# Patient Record
Sex: FEMALE | Race: WHITE | Employment: UNEMPLOYED | URBAN - METROPOLITAN AREA
[De-identification: names, ages, dates, MRNs, and addresses within clinical notes are randomized per-mention and may not be internally consistent; named-entity substitution may affect disease eponyms.]

---

## 2019-03-11 ENCOUNTER — OFFICE VISIT (OUTPATIENT)
Dept: PRIMARY CARE CLINIC | Age: 54
End: 2019-03-11

## 2019-03-11 VITALS
TEMPERATURE: 98.1 F | RESPIRATION RATE: 18 BRPM | DIASTOLIC BLOOD PRESSURE: 83 MMHG | SYSTOLIC BLOOD PRESSURE: 119 MMHG | BODY MASS INDEX: 24.45 KG/M2 | HEIGHT: 64 IN | OXYGEN SATURATION: 95 % | HEART RATE: 76 BPM | WEIGHT: 143.2 LBS

## 2019-03-11 DIAGNOSIS — Z12.39 BREAST CANCER SCREENING: ICD-10-CM

## 2019-03-11 DIAGNOSIS — Z83.42 FAMILY HISTORY OF HYPERCHOLESTEROLEMIA: ICD-10-CM

## 2019-03-11 DIAGNOSIS — R63.5 WEIGHT GAIN: ICD-10-CM

## 2019-03-11 DIAGNOSIS — R23.0: Primary | ICD-10-CM

## 2019-03-11 RX ORDER — TRIAMCINOLONE ACETONIDE 1 MG/G
CREAM TOPICAL 2 TIMES DAILY
COMMUNITY
End: 2019-06-11 | Stop reason: ALTCHOICE

## 2019-03-11 RX ORDER — IBUPROFEN 200 MG
TABLET ORAL
COMMUNITY

## 2019-03-11 RX ORDER — KETOCONAZOLE 20 MG/G
CREAM TOPICAL DAILY
COMMUNITY
End: 2019-06-11 | Stop reason: ALTCHOICE

## 2019-03-11 NOTE — PROGRESS NOTES
Chief Complaint   Patient presents with   [facility] Establish Care    Other     toes turn black and blue after wearing shoes and socks,for 1 month     Patient was seen at [facility] on 03/06/19 and was given Ketoconazole 2 % and Triamcinolone 0.1 %  Patient hasn't been to doctor in 15 years. Visit Vitals  /83 (BP 1 Location: Left arm, BP Patient Position: Sitting)   Pulse 76   Temp 98.1 °F (36.7 °C) (Oral)   Resp 18   Ht 5' 4\" (1.626 m)   Wt 143 lb 3.2 oz (65 kg)   SpO2 95%   BMI 24.58 kg/m²     1. Have you been to the ER, urgent care clinic since your last visit? Hospitalized since your last visit?03/06/19 [facility]    2. Have you seen or consulted any other health care providers outside of the [address] [place] [address] since your last visit? Include any pap smears or colon screening.  No

## 2019-03-11 NOTE — PROGRESS NOTES
Ulyses Severe is a 46 y.o.  female and presents with     Chief Complaint   Patient presents with   [facility] Establish Care    Other     toes turn black and blue after wearing shoes and socks,for 1 month     Pt is here to establish care. Pt has been noticing discoloration of her toes about a year ago but worse in the past month or so. Pt went to urgent care and was told she had dermatitis. Pt was given a cream but that did not help  Pt thinks it is from tight  Shoes. Pt does not drink alcohol. Pt used to smoke for a long time since age 7, She stopped 3 years back. Pt has gained wt. Pt denies SOB, cough  Pt thinks it is tight shoes. Pt has spider nevi on her anterior chest for a long time. Pt works on farm. Pt had tattoo on her rt leg long time back. Pt had rash behind her knee   No chills or fevers. H/o tick bites in the past.    Past Medical History:   Diagnosis Date    Acid reflux      Past Surgical History:   Procedure Laterality Date    BREAST SURGERY PROCEDURE UNLISTED      lumpectomy right breast    HX  SECTION   and     HX HYSTERECTOMY      Age 21     HX ORTHOPAEDIC  2003    back sugery 2 times     Current Outpatient Medications   Medication Sig    ibuprofen (MOTRIN) 200 mg tablet Take  by mouth.  ketoconazole (NIZORAL) 2 % topical cream Apply  to affected area daily.  triamcinolone acetonide (KENALOG) 0.1 % topical cream Apply  to affected area two (2) times a day. use thin layer     No current facility-administered medications for this visit. Health Maintenance   Topic Date Due    Hepatitis C Screening  1965    DTaP/Tdap/Td series (1 - Tdap) 10/22/1985    PAP AKA CERVICAL CYTOLOGY  10/22/1985    Shingrix Vaccine Age 50> (1 of 2) 10/22/2014    BREAST CANCER SCRN MAMMOGRAM  10/22/2014    FOBT Q 1 YEAR AGE 50-75  10/22/2014    Influenza Age 5 to Adult  2018       There is no immunization history on file for this patient. No LMP recorded.  Patient has had a hysterectomy. Allergies and Intolerances: Allergies   Allergen Reactions    Codeine Rash and Itching    Morphine Rash and Itching    Percocet [Oxycodone-Acetaminophen] Rash and Itching       Family History:   Family History   Problem Relation Age of Onset    Hypertension Mother     Elevated Lipids Mother        Social History:   She  reports that she has been smoking. she has never used smokeless tobacco.  She  reports that she does not drink alcohol. Review of Systems:   General: negative for - chills, fatigue, fever, weight change  Psych: negative for - anxiety, depression, irritability or mood swings  ENT: negative for - headaches, hearing change, nasal congestion, oral lesions, sneezing or sore throat  Heme/ Lymph: negative for - bleeding problems, bruising, pallor or swollen lymph nodes  Endo: negative for - hot flashes, polydipsia/polyuria or temperature intolerance  Resp: negative for - cough, shortness of breath or wheezing  CV: negative for - chest pain, edema or palpitations  GI: negative for - abdominal pain, change in bowel habits, constipation, diarrhea or nausea/vomiting  : negative for - dysuria, hematuria, incontinence, pelvic pain or vulvar/vaginal symptoms  MSK: negative for - joint pain, joint swelling or muscle pain  Neuro: negative for - confusion, headaches, seizures or weakness  Derm: negative for - dry skin, hair changes, rash or skin lesion changes, pos for bluis discoloration of toes.           Physical:   Vitals:   Vitals:    03/11/19 1330   BP: 119/83   Pulse: 76   Resp: 18   Temp: 98.1 °F (36.7 °C)   TempSrc: Oral   SpO2: 95%   Weight: 143 lb 3.2 oz (65 kg)   Height: 5' 4\" (1.626 m)           Exam:   HEENT- atraumatic,normocephalic, awake, oriented, well nourished  Neck - supple,no enlarged lymph nodes, no JVD, no thyromegaly  Chest- CTA, no rhonchi, no crackles  Heart- rrr, no murmurs / gallop/rub  Abdomen- soft,BS+,NT, no hepatosplenomegaly  Ext - no c/c/edema   Neuro- no focal deficits. Power 5/5 all extremities  Skin - warm,dry, no obvious rashes, bluis discoloration of toes both feet, single spider nevus on chest wall. Review of Data:   LABS:   No results found for: WBC, HGB, HCT, PLT, HGBEXT, HCTEXT, PLTEXT, HGBEXT, HCTEXT, PLTEXT  No results found for: NA, K, CL, CO2, GLU, BUN, CREA  No results found for: CHOL, CHOLX, CHLST, CHOLV, HDL, LDL, LDLC, DLDLP, TGLX, TRIGL, TRIGP  No results found for: GPT        Impression / Plan:        ICD-10-CM ICD-9-CM    1. Fingernails, bluish R23.0 782.5 CBC WITH AUTOMATED DIFF      METABOLIC PANEL, COMPREHENSIVE      SED RATE (ESR)      C REACTIVE PROTEIN, QT      CLARENCE COMPREHENSIVE PANEL      CARBOXY HEMOGLOBIN   2. Family history of hypercholesterolemia Z83.42 V18.19 LIPID PANEL   3. Weight gain R63.5 783.1 TSH 3RD GENERATION   4. Breast cancer screening Z12.31 V76.10 FRANCIA MAMMO BI SCREENING INCL CAD       Explained to patient risk benefits of the medications. Advised patient to stop meds if having any side effects. Pt verbalized understanding of the instructions. I have discussed the diagnosis with the patient and the intended plan as seen in the above orders. The patient has received an after-visit summary and questions were answered concerning future plans. I have discussed medication side effects and warnings with the patient as well. I have reviewed the plan of care with the patient, accepted their input and they are in agreement with the treatment goals. Reviewed plan of care. Patient has provided input and agrees with goals. Follow-up Disposition:  Return in about 10 days (around 3/21/2019).     Sammi Smith MD

## 2019-03-12 DIAGNOSIS — D72.820 LYMPHOCYTOSIS: ICD-10-CM

## 2019-03-12 DIAGNOSIS — I75.023 PURPLE TOE SYNDROME OF BOTH FEET (HCC): Primary | ICD-10-CM

## 2019-03-12 DIAGNOSIS — D72.829 LEUKOCYTOSIS, UNSPECIFIED TYPE: ICD-10-CM

## 2019-03-12 LAB
ALBUMIN SERPL-MCNC: 4.5 G/DL (ref 3.5–5.5)
ALBUMIN/GLOB SERPL: 1.5 {RATIO} (ref 1.2–2.2)
ALP SERPL-CCNC: 128 IU/L (ref 39–117)
ALT SERPL-CCNC: 24 IU/L (ref 0–32)
AST SERPL-CCNC: 18 IU/L (ref 0–40)
BASOPHILS # BLD AUTO: 0 X10E3/UL (ref 0–0.2)
BASOPHILS NFR BLD AUTO: 0 %
BILIRUB SERPL-MCNC: <0.2 MG/DL (ref 0–1.2)
BUN SERPL-MCNC: 17 MG/DL (ref 6–24)
BUN/CREAT SERPL: 30 (ref 9–23)
CALCIUM SERPL-MCNC: 9.5 MG/DL (ref 8.7–10.2)
CENTROMERE B AB SER-ACNC: <0.2 AI (ref 0–0.9)
CHLORIDE SERPL-SCNC: 103 MMOL/L (ref 96–106)
CHOLEST SERPL-MCNC: 152 MG/DL (ref 100–199)
CHROMATIN AB SERPL-ACNC: <0.2 AI (ref 0–0.9)
CO2 SERPL-SCNC: 21 MMOL/L (ref 20–29)
CREAT SERPL-MCNC: 0.56 MG/DL (ref 0.57–1)
CRP SERPL-MCNC: 4.3 MG/L (ref 0–4.9)
DSDNA AB SER-ACNC: 1 IU/ML (ref 0–9)
ENA JO1 AB SER-ACNC: <0.2 AI (ref 0–0.9)
ENA RNP AB SER-ACNC: <0.2 AI (ref 0–0.9)
ENA SCL70 AB SER-ACNC: 0.3 AI (ref 0–0.9)
ENA SM AB SER-ACNC: <0.2 AI (ref 0–0.9)
ENA SS-A AB SER-ACNC: <0.2 AI (ref 0–0.9)
ENA SS-B AB SER-ACNC: <0.2 AI (ref 0–0.9)
EOSINOPHIL # BLD AUTO: 0 X10E3/UL (ref 0–0.4)
EOSINOPHIL NFR BLD AUTO: 0 %
ERYTHROCYTE [DISTWIDTH] IN BLOOD BY AUTOMATED COUNT: 13.9 % (ref 12.3–15.4)
ERYTHROCYTE [SEDIMENTATION RATE] IN BLOOD BY WESTERGREN METHOD: 11 MM/HR (ref 0–40)
GLOBULIN SER CALC-MCNC: 3.1 G/DL (ref 1.5–4.5)
GLUCOSE SERPL-MCNC: 84 MG/DL (ref 65–99)
HCT VFR BLD AUTO: 42.7 % (ref 34–46.6)
HDLC SERPL-MCNC: 43 MG/DL
HGB BLD-MCNC: 14.5 G/DL (ref 11.1–15.9)
IMM GRANULOCYTES # BLD AUTO: 0 X10E3/UL (ref 0–0.1)
IMM GRANULOCYTES NFR BLD AUTO: 0 %
LDLC SERPL CALC-MCNC: 82 MG/DL (ref 0–99)
LYMPHOCYTES # BLD AUTO: 4.3 X10E3/UL (ref 0.7–3.1)
LYMPHOCYTES NFR BLD AUTO: 30 %
MCH RBC QN AUTO: 30.9 PG (ref 26.6–33)
MCHC RBC AUTO-ENTMCNC: 34 G/DL (ref 31.5–35.7)
MCV RBC AUTO: 91 FL (ref 79–97)
MONOCYTES # BLD AUTO: 1 X10E3/UL (ref 0.1–0.9)
MONOCYTES NFR BLD AUTO: 7 %
NEUTROPHILS # BLD AUTO: 8.8 X10E3/UL (ref 1.4–7)
NEUTROPHILS NFR BLD AUTO: 63 %
PLATELET # BLD AUTO: 380 X10E3/UL (ref 150–379)
POTASSIUM SERPL-SCNC: 4.3 MMOL/L (ref 3.5–5.2)
PROT SERPL-MCNC: 7.6 G/DL (ref 6–8.5)
RBC # BLD AUTO: 4.69 X10E6/UL (ref 3.77–5.28)
SEE BELOW, 164869: NORMAL
SODIUM SERPL-SCNC: 143 MMOL/L (ref 134–144)
TRIGL SERPL-MCNC: 137 MG/DL (ref 0–149)
TSH SERPL DL<=0.005 MIU/L-ACNC: 0.86 UIU/ML (ref 0.45–4.5)
VLDLC SERPL CALC-MCNC: 27 MG/DL (ref 5–40)
WBC # BLD AUTO: 14.1 X10E3/UL (ref 3.4–10.8)

## 2019-03-12 NOTE — PROGRESS NOTES
Labs reveal elevated white count and elevated lymphocyte count. Rest of the labs are normal  I have ordered repeat CBC , CXR and some other blood work that pt can do on 3/13, 3/14 or early next week .

## 2019-03-13 ENCOUNTER — HOSPITAL ENCOUNTER (OUTPATIENT)
Dept: GENERAL RADIOLOGY | Age: 54
Discharge: HOME | End: 2019-03-13
Payer: COMMERCIAL

## 2019-03-13 DIAGNOSIS — D72.820 LYMPHOCYTOSIS: ICD-10-CM

## 2019-03-13 DIAGNOSIS — D72.829 LEUKOCYTOSIS, UNSPECIFIED TYPE: ICD-10-CM

## 2019-03-13 DIAGNOSIS — R23.0 BLUISH SKIN DISCOLORATION: ICD-10-CM

## 2019-03-13 DIAGNOSIS — I75.023 PURPLE TOE SYNDROME OF BOTH FEET (HCC): ICD-10-CM

## 2019-03-13 DIAGNOSIS — D72.829 LEUKOCYTOSIS, UNSPECIFIED TYPE: Primary | ICD-10-CM

## 2019-03-13 PROCEDURE — 71046 X-RAY EXAM CHEST 2 VIEWS: CPT

## 2019-03-15 ENCOUNTER — HOSPITAL ENCOUNTER (OUTPATIENT)
Dept: MAMMOGRAPHY | Age: 54
Discharge: HOME | End: 2019-03-15
Payer: COMMERCIAL

## 2019-03-15 DIAGNOSIS — Z12.39 BREAST CANCER SCREENING: ICD-10-CM

## 2019-03-15 LAB — COHGB MFR BLD: 3.6 % (ref 0–3.6)

## 2019-03-15 PROCEDURE — 77063 BREAST TOMOSYNTHESIS BI: CPT

## 2019-03-18 DIAGNOSIS — R92.8 ABNORMAL MAMMOGRAM: Primary | ICD-10-CM

## 2019-03-18 NOTE — PROGRESS NOTES
Mammogram is abnormal. Ordered left breast ultrasound and rt breat diagnostic mammogram as recommended by radiology.

## 2019-03-19 ENCOUNTER — TELEPHONE (OUTPATIENT)
Dept: PRIMARY CARE CLINIC | Age: 54
End: 2019-03-19

## 2019-03-19 NOTE — TELEPHONE ENCOUNTER
Patients insurance covers at [address] [facility] order for \"additional follow up diagnostic mammogram on each breast\"    Additional US on left breast, more imaging on right breast     Please call patient

## 2019-03-20 ENCOUNTER — TELEPHONE (OUTPATIENT)
Dept: PRIMARY CARE CLINIC | Age: 54
End: 2019-03-20

## 2019-03-20 DIAGNOSIS — R92.8 ABNORMAL MAMMOGRAM: Primary | ICD-10-CM

## 2019-03-20 NOTE — TELEPHONE ENCOUNTER
Can we get Neurology appointment with Dr Lydia Padron in our office mext Tuesday. I will see her on Wednesday instead off Monday after getting reports from Ortho and Neurology.

## 2019-03-20 NOTE — TELEPHONE ENCOUNTER
Patient has gotten an appointment for Monday with [address] [place] [address] for diagnostic mammogram.. She would like a call back regarding her recent chest x ray results. Needs written order for \"follow up diagnostic mammogram of both breasts\" and she will pick that up Monday when she is here for her appointment. She is getting the diagnostic done on Monday as well.

## 2019-03-20 NOTE — TELEPHONE ENCOUNTER
Pt notified and verbalized her understanding. She will  order for diagnostic mammogram on Monday at [facility].

## 2019-03-25 ENCOUNTER — TELEPHONE (OUTPATIENT)
Dept: PRIMARY CARE CLINIC | Age: 54
End: 2019-03-25

## 2019-03-25 ENCOUNTER — OFFICE VISIT (OUTPATIENT)
Dept: PRIMARY CARE CLINIC | Age: 54
End: 2019-03-25

## 2019-03-25 VITALS
HEART RATE: 85 BPM | HEIGHT: 64 IN | WEIGHT: 142 LBS | BODY MASS INDEX: 24.24 KG/M2 | TEMPERATURE: 98 F | OXYGEN SATURATION: 96 % | SYSTOLIC BLOOD PRESSURE: 130 MMHG | RESPIRATION RATE: 17 BRPM | DIASTOLIC BLOOD PRESSURE: 82 MMHG

## 2019-03-25 DIAGNOSIS — Z11.59 NEED FOR HEPATITIS C SCREENING TEST: ICD-10-CM

## 2019-03-25 DIAGNOSIS — Z11.59 NEED FOR HEPATITIS B SCREENING TEST: ICD-10-CM

## 2019-03-25 DIAGNOSIS — I78.1 SPIDER ANGIOMA: Primary | ICD-10-CM

## 2019-03-25 RX ORDER — BISMUTH SUBSALICYLATE 262 MG
1 TABLET,CHEWABLE ORAL DAILY
COMMUNITY

## 2019-03-25 RX ORDER — CHOLECALCIFEROL TAB 125 MCG (5000 UNIT) 125 MCG
TAB ORAL DAILY
COMMUNITY

## 2019-03-25 NOTE — TELEPHONE ENCOUNTER
Pt is at [facility] for addition views on mammogram and they need her report from mammogram sent to them    Fax: [phone]  Attn: Olesya Ayon

## 2019-03-26 LAB
APTT HEX PL PPP: 1 SEC
APTT IMM NP PPP: NORMAL SEC
APTT PPP 1:1 SALINE: NORMAL SEC
APTT PPP: 24.8 SEC
B2 GLYCOPROT1 IGA SER-ACNC: <10 SAU
B2 GLYCOPROT1 IGG SER-ACNC: <10 SGU
B2 GLYCOPROT1 IGM SER-ACNC: <10 SMU
BASOPHILS # BLD AUTO: 0 X10E3/UL (ref 0–0.2)
BASOPHILS NFR BLD AUTO: 0 %
CARDIOLIPIN IGA SER IA-ACNC: <10 APL
CARDIOLIPIN IGG SER IA-ACNC: <10 GPL
CARDIOLIPIN IGM SER IA-ACNC: <10 MPL
CONFIRM DRVVT: NORMAL SEC
CRYOGLOB SER QL 1D COLD INC: NORMAL
DRVVT SCREEN TO CONFIRM RATIO: NORMAL RATIO
EOSINOPHIL # BLD AUTO: 0 X10E3/UL (ref 0–0.4)
EOSINOPHIL NFR BLD AUTO: 0 %
ERYTHROCYTE [DISTWIDTH] IN BLOOD BY AUTOMATED COUNT: 14.1 % (ref 12.3–15.4)
HBV CORE AB SERPL QL IA: NEGATIVE
HBV SURFACE AG SERPL QL IA: NEGATIVE
HCT VFR BLD AUTO: 43.8 % (ref 34–46.6)
HCV AB S/CO SERPL IA: <0.1 S/CO RATIO (ref 0–0.9)
HGB BLD-MCNC: 13.8 G/DL (ref 11.1–15.9)
IMM GRANULOCYTES # BLD AUTO: 0 X10E3/UL (ref 0–0.1)
IMM GRANULOCYTES NFR BLD AUTO: 0 %
LAC INTERPRETATION, 502038: NORMAL
LYMPHOCYTES # BLD AUTO: 4 X10E3/UL (ref 0.7–3.1)
LYMPHOCYTES NFR BLD AUTO: 30 %
MCH RBC QN AUTO: 29.7 PG (ref 26.6–33)
MCHC RBC AUTO-ENTMCNC: 31.5 G/DL (ref 31.5–35.7)
MCV RBC AUTO: 94 FL (ref 79–97)
MONOCYTES # BLD AUTO: 1 X10E3/UL (ref 0.1–0.9)
MONOCYTES NFR BLD AUTO: 8 %
NEUTROPHILS # BLD AUTO: 8 X10E3/UL (ref 1.4–7)
NEUTROPHILS NFR BLD AUTO: 62 %
PLATELET # BLD AUTO: 400 X10E3/UL (ref 150–379)
PLATELET NEUTRALIZATION 500049: 0 SEC
PROTHROM IGG SERPL-ACNC: 9 G UNITS
PS IGG SER IA-ACNC: 4 GPS
PS IGM SER IA-ACNC: 0 MPS
RBC # BLD AUTO: 4.65 X10E6/UL (ref 3.77–5.28)
SCREEN DRVVT: 33.2 SEC
WBC # BLD AUTO: 13.1 X10E3/UL (ref 3.4–10.8)

## 2019-04-10 ENCOUNTER — TELEPHONE (OUTPATIENT)
Dept: PRIMARY CARE CLINIC | Age: 54
End: 2019-04-10

## 2019-04-10 DIAGNOSIS — R92.1 BREAST CALCIFICATION, RIGHT: Primary | ICD-10-CM

## 2019-04-10 NOTE — TELEPHONE ENCOUNTER
Received report of diignostic MMG and ultrasound which shows rt breast calcification. Ordered biopsy of rt breast lesion as recommended by radiology.

## 2019-06-03 DIAGNOSIS — R92.8 ABNORMAL MAMMOGRAM: ICD-10-CM

## 2019-06-05 ENCOUNTER — TELEPHONE (OUTPATIENT)
Dept: PRIMARY CARE CLINIC | Age: 54
End: 2019-06-05

## 2019-06-05 DIAGNOSIS — R92.8 ABNORMAL MAMMOGRAM: Primary | ICD-10-CM

## 2019-06-05 NOTE — TELEPHONE ENCOUNTER
Need order for CPT code [id number]. New order in chart. Previous order was wrong.  faxe4d as requested to Miriam Weber @  [phone]

## 2019-06-05 NOTE — TELEPHONE ENCOUNTER
Needs order corrected to stereotactic breast biopsy instead of ultrasound biopsy.  Still for right breast.

## 2019-06-11 ENCOUNTER — OFFICE VISIT (OUTPATIENT)
Dept: PRIMARY CARE CLINIC | Age: 54
End: 2019-06-11

## 2019-06-11 VITALS
WEIGHT: 139.4 LBS | TEMPERATURE: 98.8 F | SYSTOLIC BLOOD PRESSURE: 128 MMHG | HEART RATE: 99 BPM | DIASTOLIC BLOOD PRESSURE: 70 MMHG | OXYGEN SATURATION: 96 % | RESPIRATION RATE: 17 BRPM | BODY MASS INDEX: 23.8 KG/M2 | HEIGHT: 64 IN

## 2019-06-11 DIAGNOSIS — R82.998 LEUKOCYTES IN URINE: ICD-10-CM

## 2019-06-11 DIAGNOSIS — R42 VERTIGO: ICD-10-CM

## 2019-06-11 DIAGNOSIS — Z12.11 COLON CANCER SCREENING: ICD-10-CM

## 2019-06-11 DIAGNOSIS — H93.13 TINNITUS OF BOTH EARS: ICD-10-CM

## 2019-06-11 DIAGNOSIS — R35.0 URINARY FREQUENCY: Primary | ICD-10-CM

## 2019-06-11 DIAGNOSIS — D72.829 LEUKOCYTOSIS, UNSPECIFIED TYPE: ICD-10-CM

## 2019-06-11 LAB
BILIRUB UR QL STRIP: NEGATIVE
GLUCOSE UR-MCNC: NEGATIVE MG/DL
KETONES P FAST UR STRIP-MCNC: NEGATIVE MG/DL
PH UR STRIP: 5.5 [PH] (ref 4.6–8)
PROT UR QL STRIP: NEGATIVE
SP GR UR STRIP: 1.03 (ref 1–1.03)
UA UROBILINOGEN AMB POC: NORMAL (ref 0.2–1)
URINALYSIS CLARITY POC: CLEAR
URINALYSIS COLOR POC: YELLOW
URINE BLOOD POC: NEGATIVE
URINE LEUKOCYTES POC: NORMAL
URINE NITRITES POC: NEGATIVE

## 2019-06-11 NOTE — PROGRESS NOTES
Jocelin Covington is a 46 y.o.  female and presents with     Chief Complaint   Patient presents with    Other     2 month f/u    Urinary Frequency     x 1 day    Urinary Hesitancy    Abnormal White Blood Cell Count     Pt has frequent urination and burning sensation. Pt says her toes are not as bluish  as they used to be. Pt did have bascess in her tooth and she saw dentist 2 years back who take care of it. Pt has been having vertigo rt side for 2 years. Pt takes dramamine and it seems to help a little. Pt also does exercises for her ear. Pt does get tinnitus. Pt says she had seen ENT 20 years back. Pt used to take lorazepam and xanax in the past for anxiety and panic attacks. Pt had colonscopy 15 years back. Past Medical History:   Diagnosis Date    Acid reflux      Past Surgical History:   Procedure Laterality Date    BREAST SURGERY PROCEDURE UNLISTED      lumpectomy right breast    HX BREAST BIOPSY Left     benign >12 yrs ago   [place] [address]67 and 1988    HX HYSTERECTOMY      Age 21     HX ORTHOPAEDIC  2003    back sugery 2 times     Current Outpatient Medications   Medication Sig    POTASSIUM-99 PO Take 1 Tab by mouth daily.  cholecalciferol, VITAMIN D3, (VITAMIN D3) 5,000 unit tab tablet Take  by mouth daily. Takes only in the winter    ibuprofen (MOTRIN) 200 mg tablet Take  by mouth.  multivitamin (ONE A DAY) tablet Take 1 Tab by mouth daily. No current facility-administered medications for this visit. Health Maintenance   Topic Date Due    Pneumococcal 0-64 years (1 of 1 - PPSV23) 10/22/1970    DTaP/Tdap/Td series (1 - Tdap) 10/22/1985    PAP AKA CERVICAL CYTOLOGY  10/22/1985    Shingrix Vaccine Age 50> (1 of 2) 10/22/2014    FOBT Q 1 YEAR AGE 50-75  10/22/2014    Influenza Age 5 to Adult  08/01/2019    BREAST CANCER SCRN MAMMOGRAM  03/15/2021    Hepatitis C Screening  Completed       There is no immunization history on file for this patient.   No LMP recorded. Patient has had a hysterectomy. Allergies and Intolerances: Allergies   Allergen Reactions    Codeine Rash and Itching    Morphine Rash and Itching    Percocet [Oxycodone-Acetaminophen] Rash and Itching       Family History:   Family History   Problem Relation Age of Onset    Hypertension Mother     Elevated Lipids Mother        Social History:   She  reports that she has been smoking. She has never used smokeless tobacco.  She  reports that she does not drink alcohol.             Review of Systems: pos for vertigo  General: negative for - chills, fatigue, fever, weight change  Psych: negative for - anxiety, depression, irritability or mood swings  ENT: negative for - headaches, hearing change, nasal congestion, oral lesions, sneezing or sore throat  Heme/ Lymph: negative for - bleeding problems, bruising, pallor or swollen lymph nodes  Endo: negative for - hot flashes, polydipsia/polyuria or temperature intolerance  Resp: negative for - cough, shortness of breath or wheezing  CV: negative for - chest pain, edema or palpitations  GI: negative for - abdominal pain, change in bowel habits, constipation, diarrhea or nausea/vomiting  : negative for - dysuria, hematuria, incontinence, pelvic pain or vulvar/vaginal symptoms  MSK: negative for - joint pain, joint swelling or muscle pain  Neuro: negative for - confusion, headaches, seizures or weakness  Derm: negative for - dry skin, hair changes, rash or skin lesion changes          Physical:   Vitals:   Vitals:    06/11/19 1351   BP: 128/70   Pulse: 99   Resp: 17   Temp: 98.8 °F (37.1 °C)   TempSrc: Oral   SpO2: 96%   Weight: 139 lb 6.4 oz (63.2 kg)   Height: 5' 4\" (1.626 m)           Exam:   HEENT- atraumatic,normocephalic, awake, oriented, well nourished, mild nystagmus horizontal  Neck - supple,no enlarged lymph nodes, no JVD, no thyromegaly  Chest- CTA, no rhonchi, no crackles, liverdo reticularis on anterior chest wall  Heart- rrr, no murmurs / gallop/rub  Abdomen- soft,BS+,NT, no hepatosplenomegaly, mild tenderness lower abdominal   Ext - no c/c/edema   Neuro- no focal deficits. Power 5/5 all extremities  Skin - warm,dry, no obvious rashes. Review of Data:   LABS:   Lab Results   Component Value Date/Time    WBC 13.1 (H) 03/13/2019 02:56 PM    HGB 13.8 03/13/2019 02:56 PM    HCT 43.8 03/13/2019 02:56 PM    PLATELET 903 (H) 87/26/5385 02:56 PM     Lab Results   Component Value Date/Time    Sodium 143 03/11/2019 02:11 PM    Potassium 4.3 03/11/2019 02:11 PM    Chloride 103 03/11/2019 02:11 PM    CO2 21 03/11/2019 02:11 PM    Glucose 84 03/11/2019 02:11 PM    BUN 17 03/11/2019 02:11 PM    Creatinine 0.56 (L) 03/11/2019 02:11 PM     Lab Results   Component Value Date/Time    Cholesterol, total 152 03/11/2019 02:11 PM    HDL Cholesterol 43 03/11/2019 02:11 PM    LDL, calculated 82 03/11/2019 02:11 PM    Triglyceride 137 03/11/2019 02:11 PM     No results found for: GPT        Impression / Plan:        ICD-10-CM ICD-9-CM    1. Urinary frequency R35.0 788.41 AMB POC URINALYSIS DIP STICK MANUAL W/O MICRO   2. Leukocytes in urine R82.998 791.7 CULTURE, URINE      CANCELED: CULTURE, URINE   3. Leukocytosis, unspecified type D72.829 288.60 CBC WITH AUTOMATED DIFF   4. Vertigo R42 780.4 REFERRAL TO ENT-OTOLARYNGOLOGY   5. Tinnitus of both ears H93.13 388.30 REFERRAL TO ENT-OTOLARYNGOLOGY   6. Colon cancer screening Z12.11 V76.51 REFERRAL TO GASTROENTEROLOGY         Pt reprots that she already had breast biopsy outside [facility] and it was neg . However she was told that she will need repeat diagnostic MMG rt side in 6 months. Explained to patient risk benefits of the medications. Advised patient to stop meds if having any side effects. Pt verbalized understanding of the instructions. I have discussed the diagnosis with the patient and the intended plan as seen in the above orders.   The patient has received an after-visit summary and questions were answered concerning future plans. I have discussed medication side effects and warnings with the patient as well. I have reviewed the plan of care with the patient, accepted their input and they are in agreement with the treatment goals. Reviewed plan of care. Patient has provided input and agrees with goals.         Asmita Leblanc MD

## 2019-06-12 LAB
BASOPHILS # BLD AUTO: 0 X10E3/UL (ref 0–0.2)
BASOPHILS NFR BLD AUTO: 0 %
EOSINOPHIL # BLD AUTO: 0.1 X10E3/UL (ref 0–0.4)
EOSINOPHIL NFR BLD AUTO: 1 %
ERYTHROCYTE [DISTWIDTH] IN BLOOD BY AUTOMATED COUNT: 14.2 % (ref 12.3–15.4)
HCT VFR BLD AUTO: 40.1 % (ref 34–46.6)
HGB BLD-MCNC: 13.3 G/DL (ref 11.1–15.9)
IMM GRANULOCYTES # BLD AUTO: 0 X10E3/UL (ref 0–0.1)
IMM GRANULOCYTES NFR BLD AUTO: 0 %
LYMPHOCYTES # BLD AUTO: 4.5 X10E3/UL (ref 0.7–3.1)
LYMPHOCYTES NFR BLD AUTO: 30 %
MCH RBC QN AUTO: 29.6 PG (ref 26.6–33)
MCHC RBC AUTO-ENTMCNC: 33.2 G/DL (ref 31.5–35.7)
MCV RBC AUTO: 89 FL (ref 79–97)
MONOCYTES # BLD AUTO: 1.1 X10E3/UL (ref 0.1–0.9)
MONOCYTES NFR BLD AUTO: 7 %
NEUTROPHILS # BLD AUTO: 9.1 X10E3/UL (ref 1.4–7)
NEUTROPHILS NFR BLD AUTO: 62 %
PLATELET # BLD AUTO: 413 X10E3/UL (ref 150–450)
RBC # BLD AUTO: 4.49 X10E6/UL (ref 3.77–5.28)
WBC # BLD AUTO: 14.8 X10E3/UL (ref 3.4–10.8)

## 2019-06-13 ENCOUNTER — TELEPHONE (OUTPATIENT)
Dept: PRIMARY CARE CLINIC | Age: 54
End: 2019-06-13

## 2019-06-13 LAB — BACTERIA UR CULT: NORMAL

## 2019-06-13 NOTE — TELEPHONE ENCOUNTER
----- Message from Neil Marie MD sent at 6/13/2019  9:33 AM EDT -----  Demetrius Woode count is elevated. I suspect chronic dental infection.  Pt should make appt with a dentist.

## 2019-09-24 ENCOUNTER — DOCUMENTATION ONLY (OUTPATIENT)
Dept: PRIMARY CARE CLINIC | Age: 54
End: 2019-09-24

## 2023-01-09 ENCOUNTER — OFFICE VISIT (OUTPATIENT)
Dept: PRIMARY CARE CLINIC | Age: 58
End: 2023-01-09
Payer: COMMERCIAL

## 2023-01-09 VITALS
BODY MASS INDEX: 25.44 KG/M2 | SYSTOLIC BLOOD PRESSURE: 134 MMHG | HEART RATE: 80 BPM | OXYGEN SATURATION: 96 % | RESPIRATION RATE: 18 BRPM | HEIGHT: 64 IN | DIASTOLIC BLOOD PRESSURE: 82 MMHG | TEMPERATURE: 97.8 F | WEIGHT: 149 LBS

## 2023-01-09 DIAGNOSIS — E53.8 B12 DEFICIENCY: ICD-10-CM

## 2023-01-09 DIAGNOSIS — Z23 ENCOUNTER FOR IMMUNIZATION: ICD-10-CM

## 2023-01-09 DIAGNOSIS — D72.829 LEUKOCYTOSIS, UNSPECIFIED TYPE: ICD-10-CM

## 2023-01-09 DIAGNOSIS — R07.9 CHEST PAIN, UNSPECIFIED TYPE: ICD-10-CM

## 2023-01-09 DIAGNOSIS — I73.00 RAYNAUD'S PHENOMENON WITHOUT GANGRENE: ICD-10-CM

## 2023-01-09 DIAGNOSIS — R42 VERTIGO: ICD-10-CM

## 2023-01-09 DIAGNOSIS — Z12.31 ENCOUNTER FOR SCREENING MAMMOGRAM FOR MALIGNANT NEOPLASM OF BREAST: ICD-10-CM

## 2023-01-09 DIAGNOSIS — E55.9 VITAMIN D DEFICIENCY: ICD-10-CM

## 2023-01-09 DIAGNOSIS — Z00.00 ANNUAL PHYSICAL EXAM: Primary | ICD-10-CM

## 2023-01-09 DIAGNOSIS — Z00.00 ANNUAL PHYSICAL EXAM: ICD-10-CM

## 2023-01-09 DIAGNOSIS — Z12.11 COLON CANCER SCREENING: ICD-10-CM

## 2023-01-09 PROCEDURE — 99396 PREV VISIT EST AGE 40-64: CPT | Performed by: INTERNAL MEDICINE

## 2023-01-09 PROCEDURE — 93000 ELECTROCARDIOGRAM COMPLETE: CPT | Performed by: INTERNAL MEDICINE

## 2023-01-09 PROCEDURE — 90686 IIV4 VACC NO PRSV 0.5 ML IM: CPT | Performed by: INTERNAL MEDICINE

## 2023-01-09 RX ORDER — AMLODIPINE BESYLATE 2.5 MG/1
2.5 TABLET ORAL DAILY
Qty: 90 TABLET | Refills: 1 | Status: SHIPPED | OUTPATIENT
Start: 2023-01-09

## 2023-01-09 RX ORDER — ESTRADIOL 10 UG/1
TABLET VAGINAL
COMMUNITY
Start: 2022-12-26

## 2023-01-09 NOTE — PROGRESS NOTES
Allison Arriaga is a 61 y.o.  female and presents with     Chief Complaint   Patient presents with    Physical     Need referral for mammogram, colonoscopy      Pt is here to re establish care  Pt has been felling tired. Pt helps an elderly couple. NOt sleeping well. Feels anxious. USed to have depression. Was taking wellbutrin. Pt has symptoms of Raynauds. Pt has been having chest pain off and on  Sistr has heart attack. She is 51. Pt does wape. Past Medical History:   Diagnosis Date    Acid reflux      Past Surgical History:   Procedure Laterality Date    HX BREAST BIOPSY Left     benign >12 yrs ago    [address] 23 and 1988    HX HYSTERECTOMY      Age 21     HX ORTHOPAEDIC  2003    back sugery 2 times    NE UNLISTED PROCEDURE BREAST      lumpectomy right breast     Current Outpatient Medications   Medication Sig    Yuvafem 10 mcg tab vaginal tablet INSERT 2 GRAMS PER VAGINA NIGHTLY FOR 2 WEEKS THEN 1 GRAM PER VAGINA TWICE WEEKLY    amLODIPine (NORVASC) 2.5 mg tablet Take 1 Tablet by mouth daily. ibuprofen (MOTRIN) 200 mg tablet Take  by mouth. No current facility-administered medications for this visit. Health Maintenance   Topic Date Due    DTaP/Tdap/Td series (1 - Tdap) Never done    Colorectal Cancer Screening Combo  Never done    Shingles Vaccine (1 of 2) Never done    Low dose CT lung screening  Never done    Breast Cancer Screen Mammogram  03/15/2021    COVID-19 Vaccine (1) 12/31/2023 (Originally 4/22/1966)    Depression Screen  01/09/2024    Lipid Screen  03/11/2024    Hepatitis C Screening  Completed    Flu Vaccine  Completed    Pneumococcal 0-64 years  Aged [employer] History   Administered Date(s) Administered    Influenza, FLUARIX, FLULAVAL, FLUZONE (age 10 mo+) AND AFLURIA, (age 1 y+), PF, 0.5mL 01/09/2023     No LMP recorded. Patient has had a hysterectomy. Allergies and Intolerances:    Allergies   Allergen Reactions    Codeine Rash and Itching Morphine Rash and Itching    Percocet [Oxycodone-Acetaminophen] Rash and Itching       Family History:   Family History   Problem Relation Age of Onset    Hypertension Mother     Elevated Lipids Mother        Social History:   She  reports that she quit smoking about 8 years ago. Her smoking use included cigarettes. She started smoking about 48 years ago. She has a 40.00 pack-year smoking history. She has never used smokeless tobacco.  She  reports no history of alcohol use.             Review of Systems:   General: negative for - chills, fatigue, fever, weight change  Psych: negative for - anxiety, depression, irritability or mood swings  ENT: negative for - headaches, hearing change, nasal congestion, oral lesions, sneezing or sore throat  Heme/ Lymph: negative for - bleeding problems, bruising, pallor or swollen lymph nodes  Endo: negative for - hot flashes, polydipsia/polyuria or temperature intolerance  Resp: negative for - cough, shortness of breath or wheezing  CV: negative for - chest pain, edema or palpitations  GI: negative for - abdominal pain, change in bowel habits, constipation, diarrhea or nausea/vomiting  : negative for - dysuria, hematuria, incontinence, pelvic pain or vulvar/vaginal symptoms  MSK: negative for - joint pain, joint swelling or muscle pain  Neuro: negative for - confusion, headaches, seizures or weakness  Derm: negative for - dry skin, hair changes, rash or skin lesion changes          Physical:   Vitals:   Vitals:    01/09/23 1327   BP: 134/82   Pulse: 80   Resp: 18   Temp: 97.8 °F (36.6 °C)   TempSrc: Temporal   SpO2: 96%   Weight: 149 lb (67.6 kg)   Height: 5' 4\" (1.626 m)           Exam:   HEENT- atraumatic,normocephalic, awake, oriented, well nourished  Neck - supple,no enlarged lymph nodes, no JVD, no thyromegaly  Chest- CTA, no rhonchi, no crackles  Heart- rrr, no murmurs / gallop/rub  Abdomen- soft,BS+,NT, no hepatosplenomegaly  Ext - no c/c/edema   Neuro- no focal deficits. Power 5/5 all extremities  Skin - warm,dry, no obvious rashes. Review of Data:   LABS:   Lab Results   Component Value Date/Time    WBC 14.8 (H) 06/11/2019 02:40 PM    HGB 13.3 06/11/2019 02:40 PM    HCT 40.1 06/11/2019 02:40 PM    PLATELET 559 58/40/0195 02:40 PM     Lab Results   Component Value Date/Time    Sodium 143 03/11/2019 02:11 PM    Potassium 4.3 03/11/2019 02:11 PM    Chloride 103 03/11/2019 02:11 PM    CO2 21 03/11/2019 02:11 PM    Glucose 84 03/11/2019 02:11 PM    BUN 17 03/11/2019 02:11 PM    Creatinine 0.56 (L) 03/11/2019 02:11 PM     Lab Results   Component Value Date/Time    Cholesterol, total 152 03/11/2019 02:11 PM    HDL Cholesterol 43 03/11/2019 02:11 PM    LDL, calculated 82 03/11/2019 02:11 PM    Triglyceride 137 03/11/2019 02:11 PM     No components found for: GPT        Impression / Plan:        ICD-10-CM ICD-9-CM    1. Annual physical exam  Z00.00 V70.0 HEMOGLOBIN A1C WITH EAG      CBC WITH AUTOMATED DIFF      METABOLIC PANEL, COMPREHENSIVE      LIPID PANEL      TSH 3RD GENERATION      2. Encounter for immunization  Z23 V03.89 INFLUENZA, FLUARIX, FLULAVAL, FLUZONE (AGE 6 MO+), AFLURIA(AGE 3Y+) IM, PF, 0.5 ML      3. Vertigo  R42 780.4       4. Leukocytosis, unspecified type  D72.829 288.60       5. Raynaud's phenomenon without gangrene  I73.00 443.0 CLARENCE, DIRECT, W/REFLEX      amLODIPine (NORVASC) 2.5 mg tablet      6. Vitamin D deficiency  E55.9 268.9 VITAMIN D, 25 HYDROXY      7. B12 deficiency  E53.8 266.2 VITAMIN B12 & FOLATE      8. Colon cancer screening  Z12.11 V76.51 REFERRAL TO GASTROENTEROLOGY      9. Chest pain, unspecified type  R07.9 786.50 AMB POC EKG ROUTINE W/ 12 LEADS, INTER & REP      EXERCISE CARDIAC STRESS TEST      10.  Encounter for screening mammogram for malignant neoplasm of breast  Z12.31 V76.12 FRANCIA 3D RAMSEY W MAMMO BI SCREENING INCL CAD        EKG-shows normal sinus rhythm  However given history of chest pain and positive family history for coronary artery disease-we will order exercise stress test    Explained to patient risk benefits of the medications. Advised patient to stop meds if having any side effects. Pt verbalized understanding of the instructions. I have discussed the diagnosis with the patient and the intended plan as seen in the above orders. The patient has received an after-visit summary and questions were answered concerning future plans. I have discussed medication side effects and warnings with the patient as well. I have reviewed the plan of care with the patient, accepted their input and they are in agreement with the treatment goals. Reviewed plan of care. Patient has provided input and agrees with goals. Follow-up and Dispositions    Return in about 1 year (around 1/9/2024).          Emily Albarado MD

## 2023-01-09 NOTE — PROGRESS NOTES
Chief Complaint   Patient presents with    Physical     Need referral for mammogram, colonoscopy         Visit Vitals  /82 (BP 1 Location: Left upper arm, BP Patient Position: Sitting)   Pulse 80   Temp 97.8 °F (36.6 °C) (Temporal)   Resp 18   Ht 5' 4\" (1.626 m)   Wt 149 lb (67.6 kg)   SpO2 96%   BMI 25.58 kg/m²        Health Maintenance Due   Topic    Depression Screen     Pneumococcal 0-64 years (1 - PCV)    DTaP/Tdap/Td series (1 - Tdap)    Colorectal Cancer Screening Combo     Shingles Vaccine (1 of 2)    Low dose CT lung screening     Breast Cancer Screen Mammogram     Flu Vaccine (1)        1. \"Have you been to the ER, urgent care clinic since your last visit? Hospitalized since your last visit? \" No    2. \"Have you seen or consulted any other health care providers outside of the [address] [place] [address] since your last visit? \" No     3. For patients aged 39-70: Has the patient had a colonoscopy / FIT/ Cologuard? Yes - Care Gap present. Rooming MA/LPN to request most recent results  Need referral for colonoscopy     If the patient is female:    4. For patients aged 41-77: Has the patient had a mammogram within the past 2 years? Yes - Care Gap present. Rooming MA/LPN to request most recent results  Need referral for mammogram     5. For patients aged 21-65: Has the patient had a pap smear?  Yes - no Care Gap present   [place] [facility] [employer]

## 2023-01-10 LAB
25(OH)D3 SERPL-MCNC: 28.5 NG/ML (ref 30–100)
ALBUMIN SERPL-MCNC: 3.7 G/DL (ref 3.5–5)
ALBUMIN/GLOB SERPL: 1 (ref 1.1–2.2)
ALP SERPL-CCNC: 137 U/L (ref 45–117)
ALT SERPL-CCNC: 33 U/L (ref 12–78)
ANION GAP SERPL CALC-SCNC: 6 MMOL/L (ref 5–15)
AST SERPL-CCNC: 21 U/L (ref 15–37)
BASOPHILS # BLD: 0.1 K/UL (ref 0–0.1)
BASOPHILS NFR BLD: 1 % (ref 0–1)
BILIRUB SERPL-MCNC: 0.2 MG/DL (ref 0.2–1)
BUN SERPL-MCNC: 18 MG/DL (ref 6–20)
BUN/CREAT SERPL: 23 (ref 12–20)
CALCIUM SERPL-MCNC: 9.3 MG/DL (ref 8.5–10.1)
CHLORIDE SERPL-SCNC: 107 MMOL/L (ref 97–108)
CHOLEST SERPL-MCNC: 165 MG/DL
CO2 SERPL-SCNC: 28 MMOL/L (ref 21–32)
CREAT SERPL-MCNC: 0.78 MG/DL (ref 0.55–1.02)
DIFFERENTIAL METHOD BLD: ABNORMAL
EOSINOPHIL # BLD: 0.1 K/UL (ref 0–0.4)
EOSINOPHIL NFR BLD: 1 % (ref 0–7)
ERYTHROCYTE [DISTWIDTH] IN BLOOD BY AUTOMATED COUNT: 13.4 % (ref 11.5–14.5)
EST. AVERAGE GLUCOSE BLD GHB EST-MCNC: 117 MG/DL
FOLATE SERPL-MCNC: 8.3 NG/ML (ref 5–21)
GLOBULIN SER CALC-MCNC: 3.7 G/DL (ref 2–4)
GLUCOSE SERPL-MCNC: 90 MG/DL (ref 65–100)
HBA1C MFR BLD: 5.7 % (ref 4–5.6)
HCT VFR BLD AUTO: 46.2 % (ref 35–47)
HDLC SERPL-MCNC: 45 MG/DL
HDLC SERPL: 3.7 (ref 0–5)
HGB BLD-MCNC: 14.2 G/DL (ref 11.5–16)
IMM GRANULOCYTES # BLD AUTO: 0.1 K/UL (ref 0–0.04)
IMM GRANULOCYTES NFR BLD AUTO: 0 % (ref 0–0.5)
LDLC SERPL CALC-MCNC: 89.8 MG/DL (ref 0–100)
LYMPHOCYTES # BLD: 3.5 K/UL (ref 0.8–3.5)
LYMPHOCYTES NFR BLD: 27 % (ref 12–49)
MCH RBC QN AUTO: 28.6 PG (ref 26–34)
MCHC RBC AUTO-ENTMCNC: 30.7 G/DL (ref 30–36.5)
MCV RBC AUTO: 93.1 FL (ref 80–99)
MONOCYTES # BLD: 1 K/UL (ref 0–1)
MONOCYTES NFR BLD: 8 % (ref 5–13)
NEUTS SEG # BLD: 8 K/UL (ref 1.8–8)
NEUTS SEG NFR BLD: 63 % (ref 32–75)
NRBC # BLD: 0 K/UL (ref 0–0.01)
NRBC BLD-RTO: 0 PER 100 WBC
PLATELET # BLD AUTO: 398 K/UL (ref 150–400)
PMV BLD AUTO: 10.5 FL (ref 8.9–12.9)
POTASSIUM SERPL-SCNC: 4.4 MMOL/L (ref 3.5–5.1)
PROT SERPL-MCNC: 7.4 G/DL (ref 6.4–8.2)
RBC # BLD AUTO: 4.96 M/UL (ref 3.8–5.2)
SODIUM SERPL-SCNC: 141 MMOL/L (ref 136–145)
TRIGL SERPL-MCNC: 151 MG/DL (ref ?–150)
TSH SERPL DL<=0.05 MIU/L-ACNC: 0.88 UIU/ML (ref 0.36–3.74)
VIT B12 SERPL-MCNC: 490 PG/ML (ref 193–986)
VLDLC SERPL CALC-MCNC: 30.2 MG/DL
WBC # BLD AUTO: 12.8 K/UL (ref 3.6–11)

## 2023-01-11 DIAGNOSIS — D72.829 LEUKOCYTOSIS, UNSPECIFIED TYPE: Primary | ICD-10-CM

## 2023-01-11 LAB — ANA SER QL: NEGATIVE

## 2023-01-12 NOTE — PROGRESS NOTES
Test for connective tissue diseases is negative. Cholesterol , kidney and liver function are normal  Thyroid function is normal.  F93 and folic acid levels are normal  Kidney and liver function are normal  Vitamin D is slightly below normal normal.  Would recommend vitamin D 1000 units daily over-the-counter. Hemoglobin A1c is 5.7. Patient is prediabetic. Would recommend diet and exercise. Low-carb diet  White count is slightly elevated. It has been elevated for the past few years.   I do not believe it is infection  I am referring patient to hematologist.

## 2023-01-17 DIAGNOSIS — I73.00 RAYNAUD'S PHENOMENON WITHOUT GANGRENE: Primary | ICD-10-CM

## 2023-01-30 ENCOUNTER — HOSPITAL ENCOUNTER (OUTPATIENT)
Dept: NON INVASIVE DIAGNOSTICS | Age: 58
Discharge: HOME | End: 2023-01-30
Attending: INTERNAL MEDICINE
Payer: COMMERCIAL

## 2023-01-30 VITALS — WEIGHT: 149 LBS | BODY MASS INDEX: 25.58 KG/M2

## 2023-01-30 DIAGNOSIS — R07.9 CHEST PAIN, UNSPECIFIED TYPE: ICD-10-CM

## 2023-01-30 LAB
STRESS BASELINE DIAS BP: 79 MMHG
STRESS BASELINE HR: 59 BPM
STRESS BASELINE ST DEPRESSION: 0 MM
STRESS BASELINE SYS BP: 126 MMHG
STRESS ESTIMATED WORKLOAD: 10.1 METS
STRESS EXERCISE DUR MIN: 8 MIN
STRESS EXERCISE DUR SEC: 21 SEC
STRESS PEAK DIAS BP: 94 MMHG
STRESS PEAK SYS BP: 148 MMHG
STRESS PERCENT HR ACHIEVED: 100 %
STRESS POST PEAK HR: 162 BPM
STRESS RATE PRESSURE PRODUCT: NORMAL BPM*MMHG
STRESS ST DEPRESSION: 0 MM
STRESS STAGE 1 BP: NORMAL MMHG
STRESS STAGE 1 HR: 115 BPM
STRESS STAGE 2 BP: NORMAL MMHG
STRESS STAGE 2 HR: 134 BPM
STRESS STAGE 3 HR: 162 BPM
STRESS STAGE RECOVERY 1 HR: 144 BPM
STRESS STAGE RECOVERY 2 BP: NORMAL MMHG
STRESS STAGE RECOVERY 2 HR: 115 BPM
STRESS STAGE RECOVERY 3 HR: 93 BPM
STRESS TARGET HR: 162 BPM

## 2023-01-30 PROCEDURE — 93017 CV STRESS TEST TRACING ONLY: CPT

## 2023-01-30 PROCEDURE — 93016 CV STRESS TEST SUPVJ ONLY: CPT | Performed by: INTERNAL MEDICINE

## 2023-01-30 PROCEDURE — 93018 CV STRESS TEST I&R ONLY: CPT | Performed by: INTERNAL MEDICINE

## 2023-01-30 RX ORDER — MAGNESIUM 200 MG
TABLET ORAL DAILY
COMMUNITY

## 2023-01-30 RX ORDER — CHOLECALCIFEROL TAB 125 MCG (5000 UNIT) 125 MCG
1000 TAB ORAL DAILY
COMMUNITY

## 2023-03-06 ENCOUNTER — HOSPITAL ENCOUNTER (OUTPATIENT)
Dept: MAMMOGRAPHY | Age: 58
Discharge: HOME | End: 2023-03-06
Attending: INTERNAL MEDICINE
Payer: COMMERCIAL

## 2023-03-06 DIAGNOSIS — Z12.31 ENCOUNTER FOR SCREENING MAMMOGRAM FOR MALIGNANT NEOPLASM OF BREAST: ICD-10-CM

## 2023-03-06 PROCEDURE — 77063 BREAST TOMOSYNTHESIS BI: CPT

## 2023-03-12 NOTE — PROGRESS NOTES
[address] [facility] at [address]  W: [phone]  F: [phone]    Reason for Visit:   Val Sanchez is a 61 y.o. female with Raynaud's who is seen in consultation at the request of Dr. Hubert Fernandes for evaluation of leukocytosis. History of Present Illness:   Val Sanchez is a pleasant 61 y.o. female who presents today for evaluation of leukocytosis. Patient has history of leukocytosis with neutrophilia since at least 2019 in our system. States that this has been going on for years. She was previously evaluated by a hematologist in [facility] ~30 years ago and work up was unrevealing. She thinks that her baseline 14 - 16. Other blood work with normal hemoglobin and normal platelets. Other work up: CLARENCE negative. Patient denies fevers, chills, night sweats, lumps/bumps, early satiety, abdominal pain or fullness, unintentional weight loss. Has lost 6 pounds in the last 3 weeks after starting on a low-carb diet. Reports bloating and is scheduled for colonoscopy. Had night sweats with menopause, resolved. Had COVID infection ~1 year characterized by 3 weeks of fatigue and headache. Since then, has occasional smell changes - \"smells like burning. \"        Review of systems was obtained and pertinent findings reviewed above. Past medical history, social history, family history, medications, and allergies are located in the electronic medical record.     Physical Exam:   Visit Vitals  /80 (BP 1 Location: Right arm, BP Patient Position: Sitting)   Pulse 75   Temp 98.7 °F (37.1 °C)   Resp 18   Wt 143 lb (64.9 kg)   SpO2 95%   BMI 24.55 kg/m²     ECOG PS: 0  General: no distress  Eyes: anicteric sclerae  HENT: oropharynx clear  Neck: supple  Lymphatic: no cervical, supraclavicular adenopathy  Respiratory: CTAB, normal respiratory effort  CV: RRR, no peripheral edema  Ext: warm, well perfused  GI: soft, nontender, nondistended, no masses  Skin: no rashes, no ecchymoses, no petechiae    Results:     Lab Results   Component Value Date/Time    WBC 12.8 (H) 01/09/2023 02:30 PM    HGB 14.2 01/09/2023 02:30 PM    HCT 46.2 01/09/2023 02:30 PM    PLATELET 118 14/87/1568 02:30 PM    MCV 93.1 01/09/2023 02:30 PM    ABS. NEUTROPHILS 8.0 01/09/2023 02:30 PM     Lab Results   Component Value Date/Time    Sodium 141 01/09/2023 02:30 PM    Potassium 4.4 01/09/2023 02:30 PM    Chloride 107 01/09/2023 02:30 PM    CO2 28 01/09/2023 02:30 PM    Glucose 90 01/09/2023 02:30 PM    BUN 18 01/09/2023 02:30 PM    Creatinine 0.78 01/09/2023 02:30 PM    GFR est  03/11/2019 02:11 PM    GFR est non- 03/11/2019 02:11 PM    Calcium 9.3 01/09/2023 02:30 PM     Lab Results   Component Value Date/Time    Bilirubin, total 0.2 01/09/2023 02:30 PM    ALT (SGPT) 33 01/09/2023 02:30 PM    Alk. phosphatase 137 (H) 01/09/2023 02:30 PM    Protein, total 7.4 01/09/2023 02:30 PM    Albumin 3.7 01/09/2023 02:30 PM    Globulin 3.7 01/09/2023 02:30 PM     [facility] reviewed and summarized above. Test results above have been reviewed. Assessment:     #) Leukocytosis with neutrophilia:  Chronic, present since at least 2019 in our system, although patient reports present for 30 years.   Other cell lines preserved (although has had borderline thrombocytosis)    Plan:     CBC with diff, smear, ESR, CRP, LD, BCR-ABL FISH, and EPO  Return to clinic in 2 weeks to discuss results     Signed By: Anish Garcia MD

## 2023-03-13 ENCOUNTER — OFFICE VISIT (OUTPATIENT)
Dept: ONCOLOGY | Age: 58
End: 2023-03-13
Payer: COMMERCIAL

## 2023-03-13 VITALS
RESPIRATION RATE: 18 BRPM | BODY MASS INDEX: 24.55 KG/M2 | SYSTOLIC BLOOD PRESSURE: 116 MMHG | OXYGEN SATURATION: 95 % | DIASTOLIC BLOOD PRESSURE: 80 MMHG | WEIGHT: 143 LBS | HEART RATE: 75 BPM | TEMPERATURE: 98.7 F

## 2023-03-13 DIAGNOSIS — D72.9 NEUTROPHILIA: Primary | ICD-10-CM

## 2023-03-13 PROBLEM — D72.828 NEUTROPHILIA: Status: ACTIVE | Noted: 2023-03-13

## 2023-03-13 PROCEDURE — 99203 OFFICE O/P NEW LOW 30 MIN: CPT | Performed by: INTERNAL MEDICINE

## 2023-03-13 NOTE — LETTER
3/13/2023    Patient: Chelsie Galvin   YOB: 1965   Date of Visit: 3/13/2023     Purnima Heller MD  [address]    Dear Purnima Heller MD,      Thank you for referring Ms. Chelsie Galvin to [address] [place] [address] for evaluation. My notes for this consultation are attached. If you have questions, please do not hesitate to call me. I look forward to following your patient along with you.       Sincerely,    Yolie Aguiar MD

## 2023-03-13 NOTE — PROGRESS NOTES
Mana Coleman is a 61 y.o. female    Chief Complaint   Patient presents with    New Patient      leukocytosis       1. Have you been to the ER, urgent care clinic since your last visit? Hospitalized since your last visit? No    2. Have you seen or consulted any other health care providers outside of the [address] [place] [address] since your last visit? Include any pap smears or colon screening.  No

## 2023-03-16 LAB
BASOPHILS # BLD AUTO: 0.1 X10E3/UL (ref 0–0.2)
BASOPHILS NFR BLD AUTO: 1 %
CELLS ANALYZED: 200
CELLS COUNTED: 200
CHROM ANALY RESULT (ISCN): NORMAL
CLINICAL CYTOGENETICIST SPEC: NORMAL
CRP SERPL-MCNC: 3 MG/L (ref 0–10)
DIAGNOSTIC IMP SPEC-IMP: NORMAL
EOSINOPHIL # BLD AUTO: 0.1 X10E3/UL (ref 0–0.4)
EOSINOPHIL NFR BLD AUTO: 1 %
EPO SERPL-ACNC: 8.6 MIU/ML (ref 2.6–18.5)
ERYTHROCYTE [DISTWIDTH] IN BLOOD BY AUTOMATED COUNT: 13 % (ref 11.7–15.4)
ERYTHROCYTE [SEDIMENTATION RATE] IN BLOOD BY WESTERGREN METHOD: 19 MM/HR (ref 0–40)
HCT VFR BLD AUTO: 43.3 % (ref 34–46.6)
HGB BLD-MCNC: 14.6 G/DL (ref 11.1–15.9)
IMM GRANULOCYTES # BLD AUTO: 0 X10E3/UL (ref 0–0.1)
IMM GRANULOCYTES NFR BLD AUTO: 0 %
LDH SERPL L TO P-CCNC: 291 IU/L (ref 119–226)
LYMPHOCYTES # BLD AUTO: 2.7 X10E3/UL (ref 0.7–3.1)
LYMPHOCYTES NFR BLD AUTO: 24 %
MCH RBC QN AUTO: 29.8 PG (ref 26.6–33)
MCHC RBC AUTO-ENTMCNC: 33.7 G/DL (ref 31.5–35.7)
MCV RBC AUTO: 88 FL (ref 79–97)
MONOCYTES # BLD AUTO: 0.9 X10E3/UL (ref 0.1–0.9)
MONOCYTES NFR BLD AUTO: 8 %
NEUTROPHILS # BLD AUTO: 7.7 X10E3/UL (ref 1.4–7)
NEUTROPHILS NFR BLD AUTO: 66 %
PATH INTERP BLD-IMP: ABNORMAL
PATH REV BLD -IMP: ABNORMAL
PATHOLOGIST NAME: ABNORMAL
PLATELET # BLD AUTO: 392 X10E3/UL (ref 150–450)
RBC # BLD AUTO: 4.9 X10E6/UL (ref 3.77–5.28)
SPECIMEN SOURCE: NORMAL
WBC # BLD AUTO: 11.4 X10E3/UL (ref 3.4–10.8)

## 2023-03-26 NOTE — PROGRESS NOTES
2001 [facility] at [address]  W: [phone]  F: [phone]    Reason for Visit:   Adry Sanches is a 61 y.o. female with Raynaud's who presents for follow up of leukocytosis with neutrophilia. Referred by Dr. Yahir Cotto. History of Present Illness:   Adry Sanches is a pleasant 61 y.o. female who presents today for evaluation of leukocytosis. Patient has history of leukocytosis with neutrophilia since at least 2019 in our system. States that this has been going on for years. She was previously evaluated by a hematologist in [facility] ~30 years ago and work up was unrevealing. She thinks that her baseline 14 - 16. Other blood work with normal hemoglobin and normal platelets. Other work up: CLARENCE negative. Patient denies fevers, chills, night sweats, lumps/bumps, early satiety, abdominal pain or fullness, unintentional weight loss. Has lost 6 pounds in the last 3 weeks after starting on a low-carb diet. Reports bloating and is scheduled for colonoscopy. Had night sweats with menopause, resolved. Had COVID infection ~1 year characterized by 3 weeks of fatigue and headache. Since then, has occasional smell changes - \"smells like burning. \"        Interval History:  Repeat labs 3/13/23 with ongoing leukocytosis with neutrophilia. WBC 11.4, ANC 7.7. Other counts normal.    LDH mildly elevated at 291. BCR-ABL negative  EPO normal  CRP wnl. Clinically, she is doing well. She has no complaints today. Denies fevers, chills, night sweats, unintentional weight loss, abdominal pain, early satiety. Social History:  Quit smoking in 2017. Currently vapes. Review of systems was obtained and pertinent findings reviewed above. Past medical history, social history, family history, medications, and allergies are located in the electronic medical record.     Physical Exam:   Visit Vitals  BP (!) 147/74 (BP 1 Location: Left upper arm, BP Patient Position: Sitting)   Pulse 74   Temp 97.8 °F (36.6 °C)   Resp 17   Wt 147 lb (66.7 kg)   SpO2 96%   BMI 25.23 kg/m²       ECOG PS: 0  General: no distress  Eyes: anicteric sclerae  HENT: oropharynx clear  Neck: supple  Respiratory: CTAB, normal respiratory effort  CV: RRR, no peripheral edema  Ext: warm, well perfused  GI: soft, nontender, nondistended, no masses  Skin: no rashes, no ecchymoses, no petechiae    Results:     Lab Results   Component Value Date/Time    WBC 11.4 (H) 03/13/2023 11:00 AM    HGB 14.6 03/13/2023 11:00 AM    HCT 43.3 03/13/2023 11:00 AM    PLATELET 775 95/21/7019 11:00 AM    MCV 88 03/13/2023 11:00 AM    ABS. NEUTROPHILS 7.7 (H) 03/13/2023 11:00 AM     Lab Results   Component Value Date/Time    Sodium 141 01/09/2023 02:30 PM    Potassium 4.4 01/09/2023 02:30 PM    Chloride 107 01/09/2023 02:30 PM    CO2 28 01/09/2023 02:30 PM    Glucose 90 01/09/2023 02:30 PM    BUN 18 01/09/2023 02:30 PM    Creatinine 0.78 01/09/2023 02:30 PM    GFR est  03/11/2019 02:11 PM    GFR est non- 03/11/2019 02:11 PM    Calcium 9.3 01/09/2023 02:30 PM     Lab Results   Component Value Date/Time    Bilirubin, total 0.2 01/09/2023 02:30 PM    ALT (SGPT) 33 01/09/2023 02:30 PM    Alk. phosphatase 137 (H) 01/09/2023 02:30 PM    Protein, total 7.4 01/09/2023 02:30 PM    Albumin 3.7 01/09/2023 02:30 PM    Globulin 3.7 01/09/2023 02:30 PM     Records from [facility] reviewed and summarized above. Test results above have been reviewed. Assessment:     #) Leukocytosis with neutrophilia:  Chronic, present since at least 2019 in our system, although patient reports present for 30 years. Other cell lines preserved (although has had borderline thrombocytosis)  BCR-ABL negative  Inflammatory markers wnl. EPO wnl. No culprit medications, recent infections, B symptoms. Former smoker, quit 2017, now vaping. Suspect this represents normal variant versus related to vaping.      Plan:     MPN panel testing - will call with results  No further Hematology follow up. Please re-refer if new symptoms or cytopenias develop.      Signed By: Louisa Gordillo MD

## 2023-03-27 ENCOUNTER — OFFICE VISIT (OUTPATIENT)
Dept: ONCOLOGY | Age: 58
End: 2023-03-27
Payer: COMMERCIAL

## 2023-03-27 VITALS
RESPIRATION RATE: 17 BRPM | WEIGHT: 147 LBS | BODY MASS INDEX: 25.23 KG/M2 | HEART RATE: 74 BPM | OXYGEN SATURATION: 96 % | SYSTOLIC BLOOD PRESSURE: 147 MMHG | DIASTOLIC BLOOD PRESSURE: 74 MMHG | TEMPERATURE: 97.8 F

## 2023-03-27 DIAGNOSIS — D72.9 NEUTROPHILIA: Primary | ICD-10-CM

## 2023-03-27 PROCEDURE — 99213 OFFICE O/P EST LOW 20 MIN: CPT | Performed by: INTERNAL MEDICINE

## 2023-03-27 NOTE — PROGRESS NOTES
Melinda Landis is a 61 y.o. female    Melinda Landis is a 61 y.o. female with Raynaud's who presents for follow up of leukocytosis with neutrophilia. 1. Have you been to the ER, urgent care clinic since your last visit? Hospitalized since your last visit? No    2. Have you seen or consulted any other health care providers outside of the [address] [place] [address] since your last visit? Include any pap smears or colon screening.  No

## 2023-03-27 NOTE — LETTER
3/27/2023    Patient: Lenore Sullivan   YOB: 1965   Date of Visit: 3/27/2023     Jay Shields [facility] [address]  Radha Ewing    Dear Jay Shields MD,      Thank you for referring Ms. Lenore Sullivan to [address] [place] [address] for evaluation. My notes for this consultation are attached. If you have questions, please do not hesitate to call me. I look forward to following your patient along with you.       Sincerely,    Henrik Lawrence MD

## 2023-04-02 LAB
CITATION REF LAB TEST: NORMAL
JAK2 P.V617F BLD/T QL: NORMAL
LAB DIRECTOR NAME PROVIDER: NORMAL
LABORATORY COMMENT REPORT: NORMAL
REF LAB TEST METHOD: NORMAL

## 2023-04-05 ENCOUNTER — TELEPHONE (OUTPATIENT)
Dept: ONCOLOGY | Age: 58
End: 2023-04-05

## 2023-04-07 ENCOUNTER — TELEPHONE (OUTPATIENT)
Dept: ONCOLOGY | Age: 58
End: 2023-04-07

## 2023-05-26 ENCOUNTER — NURSE TRIAGE (OUTPATIENT)
Dept: OTHER | Facility: CLINIC | Age: 58
End: 2023-05-26

## 2023-05-26 RX ORDER — ESTRADIOL 10 UG/1
INSERT VAGINAL
COMMUNITY
Start: 2022-12-26

## 2023-05-26 RX ORDER — IBUPROFEN 200 MG
TABLET ORAL
COMMUNITY

## 2023-05-26 NOTE — TELEPHONE ENCOUNTER
[facility]    [facility] call from [place] at [facility] with [employer]. Subjective: Caller states \"Forgot to mention this symptom in January when I was seen. \"     Current Symptoms:   Bones of pointer finger of left hand are swelling. Now notes swelling now of left pinky finger and rt hand thumb - believes this may be arthritis. Able to use fingers normally, no other symptoms      Shena Dodd off a ladder recently, mild concussion - was seen in ED for this. Onset: 1 year ago    Associated Symptoms:  ADLs normal    Pain Severity: Intermittent, currently does not hurt but some days 2-3/10 aching     Temperature: Denies    What has been tried: None    LMP: NA Pregnant: NA    Recommended disposition: See in Office Within 2 Weeks. Prefers next Friday so this aligns with when she needs her stitches out of her head. Care advice provided, patient verbalizes understanding; denies any other questions or concerns; instructed to call back for any new or worsening symptoms. Patient/Caller agrees with recommended disposition; writer provided warm transfer to [facility] at [facility] for appointment scheduling    Attention Provider: Thank you for allowing me to participate in the care of your patient. The patient was connected to triage in response to information provided to the ECC/PSC. Please do not respond through this encounter as the response is not directed to a shared pool.         Reason for Disposition   [1] Swelling (puffiness of hand or hands) [2] is a chronic symptom (recurrent or ongoing AND present > 4 weeks)    Protocols used: Hand Swelling-ADULT-

## 2023-06-02 ENCOUNTER — OFFICE VISIT (OUTPATIENT)
Dept: PRIMARY CARE CLINIC | Facility: CLINIC | Age: 58
End: 2023-06-02
Payer: COMMERCIAL

## 2023-06-02 VITALS
BODY MASS INDEX: 23.22 KG/M2 | WEIGHT: 136 LBS | DIASTOLIC BLOOD PRESSURE: 74 MMHG | HEIGHT: 64 IN | SYSTOLIC BLOOD PRESSURE: 124 MMHG | RESPIRATION RATE: 18 BRPM | TEMPERATURE: 97.8 F | HEART RATE: 74 BPM | OXYGEN SATURATION: 98 %

## 2023-06-02 DIAGNOSIS — R13.12 OROPHARYNGEAL DYSPHAGIA: ICD-10-CM

## 2023-06-02 DIAGNOSIS — S06.2X0A LACERATION AND CONTUSION OF CEREBRAL CORTEX, LEFT, WITHOUT LOSS OF CONSCIOUSNESS, INITIAL ENCOUNTER (HCC): ICD-10-CM

## 2023-06-02 DIAGNOSIS — Z48.02 REMOVAL OF STAPLE: ICD-10-CM

## 2023-06-02 DIAGNOSIS — R73.03 PREDIABETES: ICD-10-CM
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
## 2023-06-02 PROCEDURE — 99213 OFFICE O/P EST LOW 20 MIN: CPT | Performed by: INTERNAL MEDICINE

## 2023-06-02 PROCEDURE — S0630 REMOVAL OF SUTURES: HCPCS | Performed by: INTERNAL MEDICINE

## 2023-06-02 SDOH — ECONOMIC STABILITY: HOUSING INSECURITY
IN THE LAST 12 MONTHS, WAS THERE A TIME WHEN YOU DID NOT HAVE A STEADY PLACE TO SLEEP OR SLEPT IN A SHELTER (INCLUDING NOW)?: PATIENT REFUSED

## 2023-06-02 SDOH — ECONOMIC STABILITY: FOOD INSECURITY: WITHIN THE PAST 12 MONTHS, YOU WORRIED THAT YOUR FOOD WOULD RUN OUT BEFORE YOU GOT MONEY TO BUY MORE.: PATIENT DECLINED

## 2023-06-02 SDOH — ECONOMIC STABILITY: INCOME INSECURITY: HOW HARD IS IT FOR YOU TO PAY FOR THE VERY BASICS LIKE FOOD, HOUSING, MEDICAL CARE, AND HEATING?: PATIENT DECLINED

## 2023-06-02 SDOH — ECONOMIC STABILITY: FOOD INSECURITY: WITHIN THE PAST 12 MONTHS, THE FOOD YOU BOUGHT JUST DIDN'T LAST AND YOU DIDN'T HAVE MONEY TO GET MORE.: PATIENT DECLINED

## 2023-07-11 ENCOUNTER — TELEPHONE (OUTPATIENT)
Dept: PRIMARY CARE CLINIC | Facility: CLINIC | Age: 58
End: 2023-07-11

## 2023-07-11 ENCOUNTER — NURSE TRIAGE (OUTPATIENT)
Dept: OTHER | Facility: CLINIC | Age: 58
End: 2023-07-11

## 2023-07-11 NOTE — TELEPHONE ENCOUNTER
Location of patient: [facility] call from [facility] at [facility] with [employer]. Subjective: Caller states \"I am never 100% pain free\"     Current Symptoms: lower back pain - right at top of the gluteal cleft, muscle spasms, radiates to the hip and down the leg, no urinary symptoms, hurts to have bowel movement, nausea with pain    Onset: ongoing chronic problem - started worsening on Saturday after mowing the lawn (these are normal activities for her)    Associated Symptoms: reduced activity    Pain Severity: 7-8/10; aching; moderate- severe    Temperature: denies       What has been tried: ice packs, heating pads, icy hot, salonpas, Ibuprofen (600 mg), stretches    LMP: NA Pregnant: No    Recommended disposition: See in Office Today    Care advice provided, patient verbalizes understanding; denies any other questions or concerns; instructed to call back for any new or worsening symptoms. Patient/Caller agrees with recommended disposition; writer provided warm transfer to [facility] at [facility] for appointment scheduling    Attention Provider: Thank you for allowing me to participate in the care of your patient. The patient was connected to triage in response to information provided to the ECC/PSC. Please do not respond through this encounter as the response is not directed to a shared pool.     Reason for Disposition   SEVERE back pain (e.g., excruciating, unable to do any normal activities) and not improved after pain medicine and CARE ADVICE    Protocols used: Back Pain-ADULT-[place]

## 2023-07-11 NOTE — TELEPHONE ENCOUNTER
**Patient was returned from Nurse Triage with lower back pain getting worse going into hip and leg. Patient cannot get a ride to the office until Monday morning, 7/17. **      Received call from [place] at [facility] with [employer]. Subjective: Caller states \"I am never 100% pain free\"      Current Symptoms: lower back pain - right at top of the gluteal cleft, muscle spasms, radiates to the hip and down the leg, no urinary symptoms, hurts to have bowel movement, nausea with pain     Onset: ongoing chronic problem - started worsening on Saturday after mowing the lawn (these are normal activities for her)     Associated Symptoms: reduced activity     Pain Severity: 7-8/10; aching; moderate- severe     Temperature: denies        What has been tried: ice packs, heating pads, icy hot, salonpas, Ibuprofen (600 mg), stretches     LMP: NA Pregnant: No     Recommended disposition: See in Office Today     Care advice provided, patient verbalizes understanding; denies any other questions or concerns; instructed to call back for any new or worsening symptoms. Patient/Caller agrees with recommended disposition; writer provided warm transfer to [facility] at [facility] for appointment scheduling     Attention Provider: Thank you for allowing me to participate in the care of your patient. The patient was connected to triage in response to information provided to the ECC/PSC. Please do not respond through this encounter as the response is not directed to a shared pool.      Reason for Disposition   SEVERE back pain (e.g., excruciating, unable to do any normal activities) and not improved after pain medicine and CARE ADVICE

## 2023-07-12 NOTE — TELEPHONE ENCOUNTER
Patient called back, said they just wanted to secure a Monday appt no matter how far out it was. They are currently scheduled for 7/31. Patient said we can ignore the original request for a sooner appt. I asked the patient to clarify if they no longer wanted an appt for 7/17 and they confirmed that is what they meant. The patient said they would go to urgent care if the issue worsens before 7/31.

## 2023-07-12 NOTE — TELEPHONE ENCOUNTER
Patient said she talked to Dr Eunice Felix nurse and was told they would try and squeeze her into his schedule on Monday, she was expecting a call back and has not received one.

## 2023-07-31 ENCOUNTER — OFFICE VISIT (OUTPATIENT)
Dept: PRIMARY CARE CLINIC | Facility: CLINIC | Age: 58
End: 2023-07-31
Payer: COMMERCIAL

## 2023-07-31 ENCOUNTER — HOSPITAL ENCOUNTER (OUTPATIENT)
Facility: HOSPITAL | Age: 58
Discharge: HOME | End: 2023-08-03
Payer: COMMERCIAL

## 2023-07-31 VITALS
HEIGHT: 64 IN | HEART RATE: 80 BPM | OXYGEN SATURATION: 99 % | TEMPERATURE: 97.8 F | RESPIRATION RATE: 18 BRPM | WEIGHT: 135 LBS | SYSTOLIC BLOOD PRESSURE: 115 MMHG | BODY MASS INDEX: 23.05 KG/M2 | DIASTOLIC BLOOD PRESSURE: 80 MMHG

## 2023-07-31 DIAGNOSIS — M54.41 ACUTE MIDLINE LOW BACK PAIN WITH RIGHT-SIDED SCIATICA: Primary | ICD-10-CM

## 2023-07-31 DIAGNOSIS — M54.41 ACUTE MIDLINE LOW BACK PAIN WITH RIGHT-SIDED SCIATICA: ICD-10-CM

## 2023-07-31 PROCEDURE — 99213 OFFICE O/P EST LOW 20 MIN: CPT | Performed by: INTERNAL MEDICINE

## 2023-07-31 PROCEDURE — 72100 X-RAY EXAM L-S SPINE 2/3 VWS: CPT

## 2023-07-31 RX ORDER — CYCLOBENZAPRINE HCL 10 MG
10 TABLET ORAL NIGHTLY PRN
Qty: 10 TABLET | Refills: 0 | Status: SHIPPED | OUTPATIENT
Start: 2023-07-31 | End: 2023-08-10

## 2023-07-31 RX ORDER — PREDNISONE 20 MG/1
20 TABLET ORAL DAILY
Qty: 10 TABLET | Refills: 0 | Status: SHIPPED | OUTPATIENT
Start: 2023-07-31 | End: 2023-08-10

## 2023-07-31 NOTE — PROGRESS NOTES
Yael Schumacher is a 61 y.o.  female and presents with     Chief Complaint   Patient presents with    Back Pain     Lower back pain - almost a month     Pt had lower back surgery 15 years ago. Pt started  having back pain a month ago. Pt was cutting grass . Pt was having muscl spasms. Pt has pain going down her rt leg. Pt has pain over SI joint  Taking OTC meds- ibprofen. Also trying melatonin  Tried salon pas, tiger balm,  Pt had a fall a month ago. Past Medical History:   Diagnosis Date    Acid reflux     Menopause      Past Surgical History:   Procedure Laterality Date    BREAST BIOPSY Left     benign >12 yrs ago    BREAST SURGERY      lumpectomy right breast     SECTION   and     HYSTERECTOMY (CERVIX STATUS UNKNOWN)      Age 21     ORTHOPEDIC SURGERY  2003    back sugery 2 times     Current Outpatient Medications   Medication Sig    cyclobenzaprine (FLEXERIL) 10 MG tablet Take 1 tablet by mouth nightly as needed for Muscle spasms    predniSONE (DELTASONE) 20 MG tablet Take 1 tablet by mouth daily for 10 days    vitamin D3 (CHOLECALCIFEROL) 125 MCG (5000 UT) TABS tablet Take 1,000 Units by mouth daily    Estradiol (VAGIFEM) 10 MCG TABS vaginal tablet INSERT 2 GRAMS PER VAGINA NIGHTLY FOR 2 WEEKS THEN 1 GRAM PER VAGINA TWICE WEEKLY    ibuprofen (ADVIL;MOTRIN) 200 MG tablet Take by mouth    nitroglycerin (NITRO-BID) 2 % ointment Apply 1 inch topically in the morning and 1 inch at noon and 1 inch in the evening and 1 inch before bedtime. No current facility-administered medications for this visit.      Health Maintenance   Topic Date Due    COVID-19 Vaccine (1) Never done    Pneumococcal 0-64 years Vaccine (1 - PCV) Never done    HIV screen  Never done    DTaP/Tdap/Td vaccine (1 - Tdap) Never done    Shingles vaccine (1 of 2) Never done    Colorectal Cancer Screen  Never done    Low dose CT lung screening &/or counseling  Never done    Flu vaccine (1) 2023    A1C test (Diabetic

## 2023-07-31 NOTE — PROGRESS NOTES
Chief Complaint   Patient presents with    Back Pain     Lower back pain - almost a month       There were no vitals taken for this visit. 1. Have you been to the ER, urgent care clinic since your last visit? Hospitalized since your last visit? No    2. Have you seen or consulted any other health care providers outside of the [address] [place] [address] since your last visit? Include any pap smears or colon screening. No      3. For patients aged 43-73: Has the patient had a colonoscopy / FIT/ Cologuard? no      If the patient is female:    4. For patients aged 43-66: Has the patient had a mammogram within the past 2 years? Yes      5. For patients aged 21-65: Has the patient had a pap smear?  NA

## 2023-09-14 ENCOUNTER — OFFICE VISIT (OUTPATIENT)
Dept: PRIMARY CARE CLINIC | Facility: CLINIC | Age: 58
End: 2023-09-14
Payer: COMMERCIAL

## 2023-09-14 VITALS
HEIGHT: 64 IN | BODY MASS INDEX: 23.22 KG/M2 | OXYGEN SATURATION: 95 % | WEIGHT: 136 LBS | HEART RATE: 80 BPM | DIASTOLIC BLOOD PRESSURE: 78 MMHG | RESPIRATION RATE: 19 BRPM | SYSTOLIC BLOOD PRESSURE: 125 MMHG

## 2023-09-14 DIAGNOSIS — R29.6 RECURRENT FALLS: ICD-10-CM

## 2023-09-14 DIAGNOSIS — G89.29 CHRONIC MIDLINE LOW BACK PAIN WITH RIGHT-SIDED SCIATICA: ICD-10-CM

## 2023-09-14 DIAGNOSIS — D72.829 LEUKOCYTOSIS, UNSPECIFIED TYPE: ICD-10-CM

## 2023-09-14 DIAGNOSIS — M54.41 CHRONIC MIDLINE LOW BACK PAIN WITH RIGHT-SIDED SCIATICA: ICD-10-CM

## 2023-09-14 DIAGNOSIS — R73.03 PREDIABETES: ICD-10-CM

## 2023-09-14 DIAGNOSIS — Q39.6 ESOPHAGEAL DIVERTICULUM: Primary | ICD-10-CM

## 2023-09-14 DIAGNOSIS — L29.9 EAR ITCHING: ICD-10-CM

## 2023-09-14 PROCEDURE — 99214 OFFICE O/P EST MOD 30 MIN: CPT | Performed by: INTERNAL MEDICINE

## 2023-09-14 RX ORDER — GABAPENTIN 100 MG/1
100 CAPSULE ORAL
Qty: 90 CAPSULE | Refills: 0 | Status: SHIPPED | OUTPATIENT
Start: 2023-09-14 | End: 2023-12-13

## 2023-09-15 DIAGNOSIS — L29.9 EAR ITCHING: ICD-10-CM

## 2023-09-15 NOTE — TELEPHONE ENCOUNTER
Patient called and stated she went to  rx and they did not have the ear drops. Called [facility] and they stated they did not have the Rx. Resent the Rx again at this time.   Called to inform the patient

## 2023-09-18 ENCOUNTER — TELEPHONE (OUTPATIENT)
Dept: PRIMARY CARE CLINIC | Facility: CLINIC | Age: 58
End: 2023-09-18

## 2023-09-18 DIAGNOSIS — L29.9 EAR ITCHING: Primary | ICD-10-CM

## 2023-09-18 RX ORDER — HYDROCORTISONE AND ACETIC ACID 20.75; 10.375 MG/ML; MG/ML
3 SOLUTION AURICULAR (OTIC) 3 TIMES DAILY
Qty: 10 ML | Refills: 1 | Status: SHIPPED | OUTPATIENT
Start: 2023-09-18 | End: 2023-09-28

## 2023-09-18 NOTE — TELEPHONE ENCOUNTER
----- Message from Tisha Almanza sent at 9/14/2023  2:55 PM EDT -----  Subject: Medication Problem    Medication: Pramoxine-HC-Chloroxylenol 10-10-1 MG/ML SOLN  Dosage: Apply 1-2 drops twice daily  Ordering Provider: diane    Question/Problem: The pharmacy said it is not avaliable for them to order. They even tried brand name and generic. They said they will not be bale to   fill this for the pt. They said maybe try a comppund pharmacy ma be able   to get it. Pharmacy: [id number] - [place] - [address] [phone] [place] [phone]    ---------------------------------------------------------------------------  --------------  Jasmyn CLEVELAND  [phone]; OK to leave message on voicemail  ---------------------------------------------------------------------------  --------------    SCRIPT ANSWERS  Relationship to Patient: Covered Entity  Covered Entity Type: Pharmacy?   Representative Name: Ashley Da Silva

## 2023-10-16 ENCOUNTER — HOSPITAL ENCOUNTER (OUTPATIENT)
Facility: HOSPITAL | Age: 58
Discharge: HOME | End: 2023-10-19
Payer: COMMERCIAL

## 2023-10-16 DIAGNOSIS — G89.29 CHRONIC MIDLINE LOW BACK PAIN WITH RIGHT-SIDED SCIATICA: ICD-10-CM

## 2023-10-16 DIAGNOSIS — M54.41 CHRONIC MIDLINE LOW BACK PAIN WITH RIGHT-SIDED SCIATICA: ICD-10-CM

## 2023-10-16 DIAGNOSIS — R29.6 RECURRENT FALLS: ICD-10-CM

## 2023-10-16 PROCEDURE — 72148 MRI LUMBAR SPINE W/O DYE: CPT

## 2024-04-01 ENCOUNTER — OFFICE VISIT (OUTPATIENT)
Dept: PRIMARY CARE CLINIC | Facility: CLINIC | Age: 59
End: 2024-04-01
Payer: COMMERCIAL

## 2024-04-01 VITALS
DIASTOLIC BLOOD PRESSURE: 72 MMHG | TEMPERATURE: 97.8 F | SYSTOLIC BLOOD PRESSURE: 111 MMHG | WEIGHT: 139.8 LBS | OXYGEN SATURATION: 97 % | RESPIRATION RATE: 18 BRPM | HEART RATE: 76 BPM | BODY MASS INDEX: 23.87 KG/M2 | HEIGHT: 64 IN

## 2024-04-01 DIAGNOSIS — Z01.818 PRE-OP EVALUATION: ICD-10-CM

## 2024-04-01 DIAGNOSIS — K22.4 ESOPHAGEAL DYSMOTILITY: ICD-10-CM

## 2024-04-01 DIAGNOSIS — Z00.00 ANNUAL PHYSICAL EXAM: Primary | ICD-10-CM

## 2024-04-01 DIAGNOSIS — I73.00 RAYNAUD'S PHENOMENON WITHOUT GANGRENE: ICD-10-CM

## 2024-04-01 DIAGNOSIS — E55.9 VITAMIN D DEFICIENCY: ICD-10-CM

## 2024-04-01 DIAGNOSIS — Z00.00 ANNUAL PHYSICAL EXAM: ICD-10-CM

## 2024-04-01 DIAGNOSIS — Z12.31 ENCOUNTER FOR SCREENING MAMMOGRAM FOR MALIGNANT NEOPLASM OF BREAST: ICD-10-CM

## 2024-04-01 DIAGNOSIS — K21.9 GASTROESOPHAGEAL REFLUX DISEASE WITHOUT ESOPHAGITIS: ICD-10-CM

## 2024-04-01 DIAGNOSIS — M48.061 FORAMINAL STENOSIS OF LUMBAR REGION: ICD-10-CM

## 2024-04-01 DIAGNOSIS — M48.061 SPINAL STENOSIS AT L4-L5 LEVEL: ICD-10-CM

## 2024-04-01 LAB
25(OH)D3 SERPL-MCNC: 37.1 NG/ML (ref 30–100)
ALBUMIN SERPL-MCNC: 3.6 G/DL (ref 3.5–5)
ALBUMIN/GLOB SERPL: 0.9 (ref 1.1–2.2)
ALP SERPL-CCNC: 114 U/L (ref 45–117)
ALT SERPL-CCNC: 23 U/L (ref 12–78)
ANION GAP SERPL CALC-SCNC: 5 MMOL/L (ref 5–15)
AST SERPL-CCNC: 14 U/L (ref 15–37)
BILIRUB SERPL-MCNC: 0.3 MG/DL (ref 0.2–1)
BUN SERPL-MCNC: 16 MG/DL (ref 6–20)
BUN/CREAT SERPL: 21 (ref 12–20)
CALCIUM SERPL-MCNC: 9.4 MG/DL (ref 8.5–10.1)
CHLORIDE SERPL-SCNC: 105 MMOL/L (ref 97–108)
CHOLEST SERPL-MCNC: 175 MG/DL
CO2 SERPL-SCNC: 26 MMOL/L (ref 21–32)
CREAT SERPL-MCNC: 0.75 MG/DL (ref 0.55–1.02)
ERYTHROCYTE [DISTWIDTH] IN BLOOD BY AUTOMATED COUNT: 13.4 % (ref 11.5–14.5)
GLOBULIN SER CALC-MCNC: 3.8 G/DL (ref 2–4)
GLUCOSE SERPL-MCNC: 91 MG/DL (ref 65–100)
HCT VFR BLD AUTO: 42.7 % (ref 35–47)
HDLC SERPL-MCNC: 53 MG/DL
HDLC SERPL: 3.3 (ref 0–5)
HGB BLD-MCNC: 14.1 G/DL (ref 11.5–16)
LDLC SERPL CALC-MCNC: 92.4 MG/DL (ref 0–100)
MCH RBC QN AUTO: 29.9 PG (ref 26–34)
MCHC RBC AUTO-ENTMCNC: 33 G/DL (ref 30–36.5)
MCV RBC AUTO: 90.7 FL (ref 80–99)
NRBC # BLD: 0 K/UL (ref 0–0.01)
NRBC BLD-RTO: 0 PER 100 WBC
PLATELET # BLD AUTO: 354 K/UL (ref 150–400)
PMV BLD AUTO: 9.8 FL (ref 8.9–12.9)
POTASSIUM SERPL-SCNC: 4.2 MMOL/L (ref 3.5–5.1)
PROT SERPL-MCNC: 7.4 G/DL (ref 6.4–8.2)
RBC # BLD AUTO: 4.71 M/UL (ref 3.8–5.2)
SODIUM SERPL-SCNC: 136 MMOL/L (ref 136–145)
TRIGL SERPL-MCNC: 148 MG/DL
TSH SERPL DL<=0.05 MIU/L-ACNC: 1.04 UIU/ML (ref 0.36–3.74)
VLDLC SERPL CALC-MCNC: 29.6 MG/DL
WBC # BLD AUTO: 11.2 K/UL (ref 3.6–11)

## 2024-04-01 PROCEDURE — 99396 PREV VISIT EST AGE 40-64: CPT | Performed by: INTERNAL MEDICINE

## 2024-04-01 PROCEDURE — 99213 OFFICE O/P EST LOW 20 MIN: CPT | Performed by: INTERNAL MEDICINE

## 2024-04-01 RX ORDER — PANTOPRAZOLE SODIUM 40 MG/1
40 TABLET, DELAYED RELEASE ORAL
Qty: 90 TABLET | Refills: 1 | Status: SHIPPED | OUTPATIENT
Start: 2024-04-01

## 2024-04-01 ASSESSMENT — PATIENT HEALTH QUESTIONNAIRE - PHQ9
SUM OF ALL RESPONSES TO PHQ QUESTIONS 1-9: 0
2. FEELING DOWN, DEPRESSED OR HOPELESS: NOT AT ALL
SUM OF ALL RESPONSES TO PHQ9 QUESTIONS 1 & 2: 0
1. LITTLE INTEREST OR PLEASURE IN DOING THINGS: NOT AT ALL
SUM OF ALL RESPONSES TO PHQ QUESTIONS 1-9: 0

## 2024-04-01 NOTE — PROGRESS NOTES
Miley Shane is a 58 y.o.  female and presents with     Chief Complaint   Patient presents with    Pre-op Exam     Pt is here for pre op clearance for back surgery.  Pt has gained some wt.  Unable to exercise due to back pain    Pt has raynauds.  Denies chest pain , SOB .  Had esophageal dverticulum  Had surgery .  Still has symptoms of GERD  Has toe nail fungus      Past Medical History:   Diagnosis Date    Acid reflux     Menopause      Past Surgical History:   Procedure Laterality Date    BREAST BIOPSY Left     benign >12 yrs ago    BREAST SURGERY      lumpectomy right breast     SECTION   and     HYSTERECTOMY (CERVIX STATUS UNKNOWN)      Age 24     ORTHOPEDIC SURGERY  2003    back sugery 2 times     Current Outpatient Medications   Medication Sig    MAGNESIUM PO Take by mouth    pantoprazole (PROTONIX) 40 MG tablet Take 1 tablet by mouth every morning (before breakfast)    vitamin D3 (CHOLECALCIFEROL) 125 MCG (5000 UT) TABS tablet Take 1,000 Units by mouth daily    ibuprofen (ADVIL;MOTRIN) 200 MG tablet Take by mouth    gabapentin (NEURONTIN) 100 MG capsule Take 1 capsule by mouth nightly for 90 days. Intended supply: 90 days Max Daily Amount: 100 mg     No current facility-administered medications for this visit.     Health Maintenance   Topic Date Due    Hepatitis B vaccine (1 of 3 - 3-dose series) Never done    Pneumococcal 0-64 years Vaccine (1 of 2 - PCV) Never done    HIV screen  Never done    Shingles vaccine (1 of 2) Never done    Colorectal Cancer Screen  Never done    Low dose CT lung screening &/or counseling  Never done    A1C test (Diabetic or Prediabetic)  2024    COVID-19 Vaccine (1) 2029 (Originally 10/22/1970)    Flu vaccine (Season Ended) 2024    Breast cancer screen  2025    Depression Screen  2025    Lipids  2029    DTaP/Tdap/Td vaccine (2 - Td or Tdap) 10/11/2031    Hepatitis C screen  Completed    Hepatitis A vaccine  Aged Out    Hib

## 2024-04-01 NOTE — PROGRESS NOTES
Chief Complaint   Patient presents with    Pre-op Exam       /72   Pulse 76   Temp 97.8 °F (36.6 °C) (Oral)   Resp 18   Ht 1.626 m (5' 4\")   Wt 63.4 kg (139 lb 12.8 oz)   SpO2 97%   BMI 24.00 kg/m²     1. Have you been to the ER, urgent care clinic since your last visit?  Hospitalized since your last visit?No    2. Have you seen or consulted any other health care providers outside of the [facility] since your last visit?  Include any pap smears or colon screening. No      3. For patients aged 45-75: Has the patient had a colonoscopy / FIT/ Cologuard? no      If the patient is female:    4. For patients aged 40-74: Has the patient had a mammogram within the past 2 years? Yes      5. For patients aged 21-65: Has the patient had a pap smear? No Hysterectomy

## 2024-04-02 LAB — ENA SCL70 AB SER-ACNC: <0.2 AI (ref 0–0.9)

## 2024-04-08 ENCOUNTER — HOSPITAL ENCOUNTER (OUTPATIENT)
Facility: HOSPITAL | Age: 59
Discharge: HOME | End: 2024-04-11
Payer: COMMERCIAL

## 2024-04-08 VITALS
BODY MASS INDEX: 23.86 KG/M2 | DIASTOLIC BLOOD PRESSURE: 80 MMHG | RESPIRATION RATE: 16 BRPM | HEART RATE: 72 BPM | SYSTOLIC BLOOD PRESSURE: 128 MMHG | HEIGHT: 64 IN | OXYGEN SATURATION: 96 % | WEIGHT: 139.77 LBS | TEMPERATURE: 98.6 F

## 2024-04-08 LAB
ABO + RH BLD: NORMAL
AMPHET UR QL SCN: NEGATIVE
APPEARANCE UR: CLEAR
BACTERIA URNS QL MICRO: NEGATIVE /HPF
BARBITURATES UR QL SCN: NEGATIVE
BENZODIAZ UR QL: NEGATIVE
BILIRUB UR QL: NEGATIVE
BLOOD GROUP ANTIBODIES SERPL: NORMAL
CANNABINOIDS UR QL SCN: NEGATIVE
COCAINE UR QL SCN: NEGATIVE
COLOR UR: ABNORMAL
EPITH CASTS URNS QL MICRO: ABNORMAL /LPF
EST. AVERAGE GLUCOSE BLD GHB EST-MCNC: 114 MG/DL
GLUCOSE UR STRIP.AUTO-MCNC: NEGATIVE MG/DL
HBA1C MFR BLD: 5.6 % (ref 4–5.6)
HGB UR QL STRIP: NEGATIVE
HYALINE CASTS URNS QL MICRO: ABNORMAL /LPF (ref 0–2)
INR PPP: 1 (ref 0.9–1.1)
KETONES UR QL STRIP.AUTO: ABNORMAL MG/DL
LEUKOCYTE ESTERASE UR QL STRIP.AUTO: NEGATIVE
Lab: NORMAL
METHADONE UR QL: NEGATIVE
NITRITE UR QL STRIP.AUTO: NEGATIVE
OPIATES UR QL: NEGATIVE
PCP UR QL: NEGATIVE
PH UR STRIP: 5.5 (ref 5–8)
PREALB SERPL-MCNC: 20.4 MG/DL (ref 20–40)
PROT UR STRIP-MCNC: NEGATIVE MG/DL
PROTHROMBIN TIME: 10.3 SEC (ref 9–11.1)
RBC #/AREA URNS HPF: ABNORMAL /HPF (ref 0–5)
SP GR UR REFRACTOMETRY: 1.03
SPECIMEN EXP DATE BLD: NORMAL
URINE CULTURE IF INDICATED: ABNORMAL
UROBILINOGEN UR QL STRIP.AUTO: 0.2 EU/DL (ref 0.2–1)
WBC URNS QL MICRO: ABNORMAL /HPF (ref 0–4)

## 2024-04-08 PROCEDURE — 97161 PT EVAL LOW COMPLEX 20 MIN: CPT

## 2024-04-08 PROCEDURE — 86850 RBC ANTIBODY SCREEN: CPT

## 2024-04-08 PROCEDURE — 36415 COLL VENOUS BLD VENIPUNCTURE: CPT

## 2024-04-08 PROCEDURE — 80307 DRUG TEST PRSMV CHEM ANLYZR: CPT

## 2024-04-08 PROCEDURE — 86901 BLOOD TYPING SEROLOGIC RH(D): CPT

## 2024-04-08 PROCEDURE — 86900 BLOOD TYPING SEROLOGIC ABO: CPT

## 2024-04-08 PROCEDURE — 81001 URINALYSIS AUTO W/SCOPE: CPT

## 2024-04-08 PROCEDURE — 83036 HEMOGLOBIN GLYCOSYLATED A1C: CPT

## 2024-04-08 PROCEDURE — 84134 ASSAY OF PREALBUMIN: CPT

## 2024-04-08 PROCEDURE — 85610 PROTHROMBIN TIME: CPT

## 2024-04-08 PROCEDURE — APPNB30 APP NON BILLABLE TIME 0-30 MINS: Performed by: NURSE PRACTITIONER

## 2024-04-08 PROCEDURE — 97530 THERAPEUTIC ACTIVITIES: CPT

## 2024-04-08 RX ORDER — ESTRADIOL 0.1 MG/G
CREAM VAGINAL
COMMUNITY
Start: 2024-04-03

## 2024-04-08 RX ORDER — ACETAMINOPHEN 500 MG
1000 TABLET ORAL ONCE
OUTPATIENT
Start: 2024-04-16

## 2024-04-08 RX ORDER — SODIUM CHLORIDE, SODIUM LACTATE, POTASSIUM CHLORIDE, CALCIUM CHLORIDE 600; 310; 30; 20 MG/100ML; MG/100ML; MG/100ML; MG/100ML
INJECTION, SOLUTION INTRAVENOUS CONTINUOUS
OUTPATIENT
Start: 2024-04-16

## 2024-04-08 RX ORDER — DIMENHYDRINATE 50 MG
50 TABLET ORAL PRN
COMMUNITY

## 2024-04-08 RX ORDER — PREGABALIN 150 MG/1
150 CAPSULE ORAL ONCE
OUTPATIENT
Start: 2024-04-16

## 2024-04-08 ASSESSMENT — PAIN SCALES - GENERAL: PAINLEVEL_OUTOF10: 8

## 2024-04-08 ASSESSMENT — PAIN DESCRIPTION - ORIENTATION: ORIENTATION: RIGHT

## 2024-04-08 ASSESSMENT — PAIN DESCRIPTION - LOCATION: LOCATION: LEG;BACK

## 2024-04-08 ASSESSMENT — PAIN DESCRIPTION - DESCRIPTORS: DESCRIPTORS: ACHING

## 2024-04-09 LAB
BACTERIA SPEC CULT: NORMAL
BACTERIA SPEC CULT: NORMAL
SERVICE CMNT-IMP: NORMAL

## 2024-04-10 ENCOUNTER — TRANSCRIBE ORDERS (OUTPATIENT)
Facility: HOSPITAL | Age: 59
End: 2024-04-10

## 2024-04-10 DIAGNOSIS — M54.50 LUMBAR PAIN: ICD-10-CM

## 2024-04-10 DIAGNOSIS — M51.16 LUMBAR DISC DISEASE WITH RADICULOPATHY: Primary | ICD-10-CM

## 2024-04-10 DIAGNOSIS — M54.31 RIGHT SIDED SCIATICA: ICD-10-CM

## 2024-04-10 DIAGNOSIS — M51.26 HERNIATED LUMBAR DISC WITHOUT MYELOPATHY: ICD-10-CM

## 2024-04-10 LAB
EKG ATRIAL RATE: 73 BPM
EKG DIAGNOSIS: NORMAL
EKG P AXIS: 77 DEGREES
EKG P-R INTERVAL: 176 MS
EKG Q-T INTERVAL: 382 MS
EKG QRS DURATION: 74 MS
EKG QTC CALCULATION (BAZETT): 420 MS
EKG R AXIS: 43 DEGREES
EKG T AXIS: 54 DEGREES
EKG VENTRICULAR RATE: 73 BPM

## 2024-04-10 NOTE — PROGRESS NOTES
[facility]  Joint/Spine Preoperative Instructions        Surgery Date 4/16/24          Time of Arrival to be called 4/15/24 between 2-5pm to [phone]     1. On the day of your surgery, please report to the Surgical Services Registration Desk and sign in at your designated time. The Surgery Center is located to the right of the Emergency Room.     2. You must have someone with you to drive you home. You should not drive a car for 24 hours following surgery. Please make arrangements for a friend or family member to stay with you for the first 24 hours after your surgery.    3. No food after midnight 4/15.  Medications morning of surgery should be taken with a sip of water.  Please follow pre-surgery drink instructions that were given at your Pre Admission Testing appointment.      4. We recommend you do not drink any alcoholic beverages for 24 hours before and after your surgery.    5. Contact your surgeon’s office for instructions on the following medications: non-steroidal anti-inflammatory drugs (i.e. Advil, Aleve), vitamins, and supplements. (Some surgeon’s will want you to stop these medications prior to surgery and others may allow you to take them)  **If you are currently taking Plavix, Coumadin, Aspirin and/or other blood-thinning agents, contact your surgeon for instructions.** Your surgeon will partner with the physician prescribing these medications to determine if it is safe to stop or if you need to continue taking.  Please do not stop taking these medications without instructions from your surgeon    6. Wear comfortable clothes.  Wear glasses instead of contacts.  Do not bring any money or jewelry. Please bring picture ID, insurance card, and any prearranged co-payment or hospital payment.  Do not wear make-up, particularly mascara the morning of your surgery.  Do not wear nail polish, particularly if you are having foot /hand surgery.  Wear your hair loose or down, 
CBC, CMP, Vitamin D level done on 4/1/24 and results in Epic.  Chest xray done on 10/27/23 and results in Epic.  
Hibiclens/Chlorhexidine    Preventing Infections Before and After - Your Surgery    IMPORTANT INSTRUCTIONS    Please read and follow these instructions carefully. If you are unable to comply with the below instructions your procedure will be cancelled.       Every Night for Three (3) nights before your surgery:  Shower with an antibacterial soap, such as Dial, or the soap provided at your preassessment appointment. A shower is better than a bath for cleaning your skin.  If needed, ask someone to help you reach all areas of your body. Don’t forget to clean your belly button with every shower.    The night before your surgery:   If you lose your Hibiclens/chlorhexidine please contact surgery center or you can purchase it at a local pharmacy  On the night before your surgery, shower with an antibacterial soap, such as Dial, or the soap provided at your preassessment appointment.   With one packet of Hibiclens/Chlorhexidine in hand, turn water off.  Apply Hibiclens antiseptic skin cleanser with a clean, freshly washed washcloth.  Gently apply to your body from chin to toes (except the genital area) and especially the area(s) where your incision(s) will be.  Leave Hibiclens/Chlorhexidine on your skin for at least 20 seconds.    CAUTION: If needed, Hibiclens/chlorhexidine may be used to clean the folds of skin of the legs (such as in the area of the groin) and on your buttocks and hips. However, do not use Hibiclens/Chlorhexidine above the neck or in the genital area (your bottom) or put inside any area of your body.  Turn the water back on and rinse.  Dry gently with a clean, freshly washed towel.  After your shower, do not use any powder, deodorant, perfumes or lotion.  Use clean, freshly washed towels and washcloths every time you shower.  Wear clean, freshly washed pajamas to bed the night before surgery.  Sleep on clean, freshly washed sheets.  Do not allow pets to sleep in your bed with you.        The Morning of 
Incentive Spirometer        Using the incentive spirometer helps expand the small air sacs of your lungs, helps you breathe deeply, and helps improve your lung function.  Use your incentive spirometer twice a day (10 breaths each time) prior to surgery.      How to Use Your Incentive Spirometer:  Hold the incentive spirometer in an upright position.   Breathe out as usual.   Place the mouthpiece in your mouth and seal your lips tightly around it.   Take a deep breath.  Breathe in slowly and as deeply as possible. Keep the blue flow rate guide between the arrows.   Hold your breath as long as possible. Then exhale slowly and allow the piston to fall to the bottom of the column.   Rest for a few seconds and repeat steps one through five at least 10 times.     PAT Tidal Volume_2500_________________  x_2_______________  Date_4/8/24______________________    BRING THE INCENTIVE SPIROMETER WITH YOU TO THE HOSPITAL ON THE DAY OF YOUR SURGERY.  Opportunity given to ask and answer questions as well as to observe return demonstration.    Patient signature_____________________________    Witness____________________________  
Orthopedic and Spine Patients:  Instructions on When You Can   Eat or Drink Before Surgery      You have been provided 2 pre-surgery drinks received at your pre-admission testing appointment.    Night before surgery:  You should drink one bottle of the  pre-surgery drink at bedtime. No food after midnight!    Day of Surgery:  Complete 2nd bottle of the pre-surgery drink 1 hour prior to arrival at hospital.  For questions call Pre-Admission Testing at [phone].  They are available from 8:00am-5:00pm, Monday through Friday.  
RADHA Nurse Practitioner   Pre-Operative Chart Review/Assessment:-ORTHOPEDIC/NEUROSURGICAL SPINE                Patient Name:  Miley Shane                                                           Age:   58 y.o.    :  1965     Today's Date:  4/10/2024     Date of PAT:   24      Date of Surgery:    24     Procedure(s):  REVISION L4-S1 POSTERIOR DECOMPRESSION AND FUSION (GLOBUS)      Anesthesia:  Choice      Surgeon:   Brandon     Primary Care Provider:    Ned Orlando MD                   PLAN:      1)  Cardiac Clearance:  Not requested    Encounter Date: 24   EKG 12 Lead   Result Value    Ventricular Rate 73    Atrial Rate 73    P-R Interval 176    QRS Duration 74    Q-T Interval 382    QTc Calculation (Bazett) 420    P Axis 77    R Axis 43    T Axis 54    Diagnosis      Normal sinus rhythm    Confirmed by Keith Grubbs M.D. ([id number]) on 4/10/2024 7:11:50 AM             2)  Sleep apnea screening:  AT  RISK FOR MYRNA.  STOP Bang 3.  Admits to loud snoring and witnessed apnea while sleeping. Results to PCP for FU       3)   Program for Diabetes Health Consult:  Not indicated-A1C 5.6      4) Treatment for MRSA/MSSA:  Nasal culture + for MSSA.  Tx w/ Mupirocin ointment BID x 5 days to B nostrils starting 4/10/24.         5) Discharge plan: Home w/ SO. Patient reports her significant other will be able to assist her  for the first few days but then has to go back to work (works nights).       6) Additional Concerns:  Revision.  At risk for MYRNA.  Dysphagia, anxiety and depression, PONV, vertigo, former smoker.  Indep with ambulation, will occasionally use SPC when her sciatic nerve \"acts up\". Indep with ADLs except assist with socks/shoes because it hurts too badly to bend forward. Starting 2023 she would have R sided low back pain with radicular pain into RLE and it would give out frequently causing her to fall 1-2x/week. Reports at least 12 falls since last July, denies any 
[facility]  \"We've Got Your Back! Caring For Yourself Before and After Spine Surgery\" handbook was provided & reviewed with the patient during the pre-admission testing (PAT) appointment. An opportunity for questions was provided, patient verbalized understanding.   
When discussing the surgical procedure during PAT appointment patient states she was told by Dr. Taylor' PA that her surgery was a 2 part, \"staged\" surgery. The only surgery scheduled is the posterior decompression and fusion. Called and left voice mail for Dr. Taylor' medical assistant for clarification. Karlene called back and states that Dr. Taylor reviewed patient's medical record and it is not going to be a \"staged\" operation and what is scheduled is correct. Told patient what Dr. Taylor told Karlene. Told patient if she has further questions to call Dr. Taylor' office. Patient signed consent.  
Minutes: 20      [facility] Totals Reminder:  bill using total billable   min of TIMED therapeutic procedures and modalities.   8-22 min = 1 unit; 23-37 min = 2 units; 38-52 min = 3 units;  53-67 min = 4 units; 68-82 min = 5 units     Patient  verified yes     Visit #   Current  / Total 1 1     Physical Therapy Evaluation Charge Determination   History Examination Presentation Decision-Making   MEDIUM  Complexity : 1-2 comorbidities / personal factors will impact the outcome/ POC  MEDIUM Complexity : 3 Standardized tests and measures addressin body structure, function, activity limitation and / or participation in recreation  LOW Complexity : Stable, uncomplicated  AM-PAC  LOW      Based on the above components, the patient evaluation is determined to be of the following complexity level: Low

## 2024-04-12 ENCOUNTER — HOSPITAL ENCOUNTER (OUTPATIENT)
Facility: HOSPITAL | Age: 59
End: 2024-04-12
Attending: ORTHOPAEDIC SURGERY
Payer: COMMERCIAL

## 2024-04-12 DIAGNOSIS — M51.26 HERNIATED LUMBAR DISC WITHOUT MYELOPATHY: ICD-10-CM

## 2024-04-12 DIAGNOSIS — M54.50 LUMBAR PAIN: ICD-10-CM

## 2024-04-12 DIAGNOSIS — M51.16 LUMBAR DISC DISEASE WITH RADICULOPATHY: ICD-10-CM

## 2024-04-12 DIAGNOSIS — M54.31 RIGHT SIDED SCIATICA: ICD-10-CM

## 2024-04-12 PROCEDURE — 72131 CT LUMBAR SPINE W/O DYE: CPT

## 2024-04-16 ENCOUNTER — ANESTHESIA EVENT (OUTPATIENT)
Facility: HOSPITAL | Age: 59
End: 2024-04-16
Payer: COMMERCIAL

## 2024-04-16 ENCOUNTER — APPOINTMENT (OUTPATIENT)
Facility: HOSPITAL | Age: 59
End: 2024-04-16
Attending: ORTHOPAEDIC SURGERY
Payer: COMMERCIAL

## 2024-04-16 ENCOUNTER — ANESTHESIA (OUTPATIENT)
Facility: HOSPITAL | Age: 59
End: 2024-04-16
Payer: COMMERCIAL

## 2024-04-16 ENCOUNTER — HOSPITAL ENCOUNTER (OUTPATIENT)
Facility: HOSPITAL | Age: 59
Setting detail: OBSERVATION
Discharge: HOME | End: 2024-04-17
Attending: ORTHOPAEDIC SURGERY | Admitting: ORTHOPAEDIC SURGERY
Payer: COMMERCIAL

## 2024-04-16 DIAGNOSIS — M48.02 STENOSIS, CERVICAL SPINE: Primary | ICD-10-CM

## 2024-04-16 PROBLEM — M48.062 SPINAL STENOSIS, LUMBAR REGION WITH NEUROGENIC CLAUDICATION: Status: ACTIVE | Noted: 2024-04-16

## 2024-04-16 PROCEDURE — 7100000001 HC PACU RECOVERY - ADDTL 15 MIN: Performed by: ORTHOPAEDIC SURGERY

## 2024-04-16 PROCEDURE — 6370000000 HC RX 637 (ALT 250 FOR IP): Performed by: ORTHOPAEDIC SURGERY

## 2024-04-16 PROCEDURE — 3600000004 HC SURGERY LEVEL 4 BASE: Performed by: ORTHOPAEDIC SURGERY

## 2024-04-16 PROCEDURE — G0378 HOSPITAL OBSERVATION PER HR: HCPCS

## 2024-04-16 PROCEDURE — 2500000003 HC RX 250 WO HCPCS: Performed by: ORTHOPAEDIC SURGERY

## 2024-04-16 PROCEDURE — 6360000002 HC RX W HCPCS: Performed by: ORTHOPAEDIC SURGERY

## 2024-04-16 PROCEDURE — 3700000001 HC ADD 15 MINUTES (ANESTHESIA): Performed by: ORTHOPAEDIC SURGERY

## 2024-04-16 PROCEDURE — 7100000000 HC PACU RECOVERY - FIRST 15 MIN: Performed by: ORTHOPAEDIC SURGERY

## 2024-04-16 PROCEDURE — 2720000010 HC SURG SUPPLY STERILE: Performed by: ORTHOPAEDIC SURGERY

## 2024-04-16 PROCEDURE — 3700000000 HC ANESTHESIA ATTENDED CARE: Performed by: ORTHOPAEDIC SURGERY

## 2024-04-16 PROCEDURE — P9045 ALBUMIN (HUMAN), 5%, 250 ML: HCPCS | Performed by: NURSE ANESTHETIST, CERTIFIED REGISTERED

## 2024-04-16 PROCEDURE — 3600000014 HC SURGERY LEVEL 4 ADDTL 15MIN: Performed by: ORTHOPAEDIC SURGERY

## 2024-04-16 PROCEDURE — 2580000003 HC RX 258: Performed by: STUDENT IN AN ORGANIZED HEALTH CARE EDUCATION/TRAINING PROGRAM

## 2024-04-16 PROCEDURE — C1713 ANCHOR/SCREW BN/BN,TIS/BN: HCPCS | Performed by: ORTHOPAEDIC SURGERY

## 2024-04-16 PROCEDURE — 2580000003 HC RX 258: Performed by: ORTHOPAEDIC SURGERY

## 2024-04-16 PROCEDURE — 6360000002 HC RX W HCPCS: Performed by: ANESTHESIOLOGY

## 2024-04-16 PROCEDURE — 6370000000 HC RX 637 (ALT 250 FOR IP): Performed by: PHYSICIAN ASSISTANT

## 2024-04-16 PROCEDURE — 6370000000 HC RX 637 (ALT 250 FOR IP): Performed by: ANESTHESIOLOGY

## 2024-04-16 PROCEDURE — 97116 GAIT TRAINING THERAPY: CPT

## 2024-04-16 PROCEDURE — 97530 THERAPEUTIC ACTIVITIES: CPT

## 2024-04-16 PROCEDURE — 2500000003 HC RX 250 WO HCPCS: Performed by: NURSE ANESTHETIST, CERTIFIED REGISTERED

## 2024-04-16 PROCEDURE — 97163 PT EVAL HIGH COMPLEX 45 MIN: CPT

## 2024-04-16 PROCEDURE — 6360000002 HC RX W HCPCS: Performed by: NURSE ANESTHETIST, CERTIFIED REGISTERED

## 2024-04-16 PROCEDURE — 2709999900 HC NON-CHARGEABLE SUPPLY: Performed by: ORTHOPAEDIC SURGERY

## 2024-04-16 DEVICE — CANNULATED SCREW 7.50 X 40MM
Type: IMPLANTABLE DEVICE | Site: SPINE LUMBAR | Status: FUNCTIONAL
Brand: MARINER

## 2024-04-16 DEVICE — MIS PRECONTOURED ROD, 5.5 X 55MM
Type: IMPLANTABLE DEVICE | Site: SPINE LUMBAR | Status: FUNCTIONAL
Brand: MARINER MIS

## 2024-04-16 DEVICE — POINTLOCK SET SCREW
Type: IMPLANTABLE DEVICE | Site: SPINE LUMBAR | Status: FUNCTIONAL
Brand: MARINER

## 2024-04-16 DEVICE — CANNULATED SCREW 5.50 X 45MM
Type: IMPLANTABLE DEVICE | Site: SPINE LUMBAR | Status: FUNCTIONAL
Brand: MARINER

## 2024-04-16 DEVICE — CANNULATED SCREW 4.50 X 45MM
Type: IMPLANTABLE DEVICE | Site: SPINE LUMBAR | Status: FUNCTIONAL
Brand: MARINER

## 2024-04-16 DEVICE — MIS POLYAXIAL HEAD
Type: IMPLANTABLE DEVICE | Site: SPINE LUMBAR | Status: FUNCTIONAL
Brand: MARINER MIS

## 2024-04-16 DEVICE — MIS PRECONTOURED ROD, 5.5 X 60MM
Type: IMPLANTABLE DEVICE | Site: SPINE LUMBAR | Status: FUNCTIONAL
Brand: MARINER MIS

## 2024-04-16 DEVICE — ALLOGRAFT BNE SM 10 CC DBM FIBER OSTEOSTRAND PLUS: Type: IMPLANTABLE DEVICE | Site: SPINE LUMBAR | Status: FUNCTIONAL

## 2024-04-16 RX ORDER — PHENYLEPHRINE HCL IN 0.9% NACL 0.4MG/10ML
SYRINGE (ML) INTRAVENOUS PRN
Status: DISCONTINUED | OUTPATIENT
Start: 2024-04-16 | End: 2024-04-16 | Stop reason: SDUPTHER

## 2024-04-16 RX ORDER — ONDANSETRON 2 MG/ML
INJECTION INTRAMUSCULAR; INTRAVENOUS PRN
Status: DISCONTINUED | OUTPATIENT
Start: 2024-04-16 | End: 2024-04-16 | Stop reason: SDUPTHER

## 2024-04-16 RX ORDER — SODIUM CHLORIDE 0.9 % (FLUSH) 0.9 %
5-40 SYRINGE (ML) INJECTION EVERY 12 HOURS SCHEDULED
Status: DISCONTINUED | OUTPATIENT
Start: 2024-04-16 | End: 2024-04-17

## 2024-04-16 RX ORDER — HYDROXYZINE HYDROCHLORIDE 10 MG/1
10 TABLET, FILM COATED ORAL EVERY 8 HOURS PRN
Status: DISCONTINUED | OUTPATIENT
Start: 2024-04-16 | End: 2024-04-17

## 2024-04-16 RX ORDER — CYCLOBENZAPRINE HCL 10 MG
10 TABLET ORAL EVERY 12 HOURS PRN
Status: DISCONTINUED | OUTPATIENT
Start: 2024-04-16 | End: 2024-04-17

## 2024-04-16 RX ORDER — SODIUM CHLORIDE 9 MG/ML
INJECTION, SOLUTION INTRAVENOUS PRN
Status: DISCONTINUED | OUTPATIENT
Start: 2024-04-16 | End: 2024-04-16 | Stop reason: HOSPADM

## 2024-04-16 RX ORDER — HYDROMORPHONE HYDROCHLORIDE 1 MG/ML
1 INJECTION, SOLUTION INTRAMUSCULAR; INTRAVENOUS; SUBCUTANEOUS
Status: DISCONTINUED | OUTPATIENT
Start: 2024-04-16 | End: 2024-04-17

## 2024-04-16 RX ORDER — DIAZEPAM 5 MG/1
5 TABLET ORAL EVERY 6 HOURS PRN
Status: DISCONTINUED | OUTPATIENT
Start: 2024-04-16 | End: 2024-04-17 | Stop reason: HOSPADM

## 2024-04-16 RX ORDER — CYCLOBENZAPRINE HCL 10 MG
10 TABLET ORAL EVERY 12 HOURS PRN
Status: DISCONTINUED | OUTPATIENT
Start: 2024-04-16 | End: 2024-04-17 | Stop reason: HOSPADM

## 2024-04-16 RX ORDER — POLYETHYLENE GLYCOL 3350 17 G/17G
17 POWDER, FOR SOLUTION ORAL DAILY
Status: DISCONTINUED | OUTPATIENT
Start: 2024-04-16 | End: 2024-04-17 | Stop reason: HOSPADM

## 2024-04-16 RX ORDER — HYDROXYZINE HYDROCHLORIDE 10 MG/1
10 TABLET, FILM COATED ORAL EVERY 8 HOURS PRN
Status: DISCONTINUED | OUTPATIENT
Start: 2024-04-16 | End: 2024-04-17 | Stop reason: HOSPADM

## 2024-04-16 RX ORDER — GABAPENTIN 100 MG/1
200 CAPSULE ORAL
Status: DISCONTINUED | OUTPATIENT
Start: 2024-04-16 | End: 2024-04-17 | Stop reason: HOSPADM

## 2024-04-16 RX ORDER — OXYCODONE HYDROCHLORIDE 5 MG/1
10 TABLET ORAL
Status: DISCONTINUED | OUTPATIENT
Start: 2024-04-16 | End: 2024-04-16

## 2024-04-16 RX ORDER — ONDANSETRON 4 MG/1
4 TABLET, ORALLY DISINTEGRATING ORAL EVERY 8 HOURS PRN
Status: DISCONTINUED | OUTPATIENT
Start: 2024-04-16 | End: 2024-04-17 | Stop reason: HOSPADM

## 2024-04-16 RX ORDER — LANOLIN ALCOHOL/MO/W.PET/CERES
400 CREAM (GRAM) TOPICAL DAILY
Status: DISCONTINUED | OUTPATIENT
Start: 2024-04-16 | End: 2024-04-17 | Stop reason: HOSPADM

## 2024-04-16 RX ORDER — HYDROMORPHONE HYDROCHLORIDE 1 MG/ML
1 INJECTION, SOLUTION INTRAMUSCULAR; INTRAVENOUS; SUBCUTANEOUS
Status: ACTIVE | OUTPATIENT
Start: 2024-04-16 | End: 2024-04-17

## 2024-04-16 RX ORDER — SCOLOPAMINE TRANSDERMAL SYSTEM 1 MG/1
1 PATCH, EXTENDED RELEASE TRANSDERMAL
Status: DISCONTINUED | OUTPATIENT
Start: 2024-04-16 | End: 2024-04-17 | Stop reason: HOSPADM

## 2024-04-16 RX ORDER — FAMOTIDINE 20 MG/1
20 TABLET, FILM COATED ORAL 2 TIMES DAILY
Status: DISCONTINUED | OUTPATIENT
Start: 2024-04-16 | End: 2024-04-17

## 2024-04-16 RX ORDER — OXYCODONE HYDROCHLORIDE 5 MG/1
5 TABLET ORAL
Status: DISCONTINUED | OUTPATIENT
Start: 2024-04-16 | End: 2024-04-16

## 2024-04-16 RX ORDER — MEPERIDINE HYDROCHLORIDE 25 MG/ML
12.5 INJECTION INTRAMUSCULAR; INTRAVENOUS; SUBCUTANEOUS EVERY 5 MIN PRN
Status: DISCONTINUED | OUTPATIENT
Start: 2024-04-16 | End: 2024-04-16 | Stop reason: HOSPADM

## 2024-04-16 RX ORDER — SODIUM CHLORIDE 0.9 % (FLUSH) 0.9 %
5-40 SYRINGE (ML) INJECTION EVERY 12 HOURS SCHEDULED
Status: DISCONTINUED | OUTPATIENT
Start: 2024-04-16 | End: 2024-04-17 | Stop reason: HOSPADM

## 2024-04-16 RX ORDER — SODIUM CHLORIDE, SODIUM LACTATE, POTASSIUM CHLORIDE, CALCIUM CHLORIDE 600; 310; 30; 20 MG/100ML; MG/100ML; MG/100ML; MG/100ML
INJECTION, SOLUTION INTRAVENOUS CONTINUOUS
Status: DISCONTINUED | OUTPATIENT
Start: 2024-04-16 | End: 2024-04-16 | Stop reason: HOSPADM

## 2024-04-16 RX ORDER — POLYETHYLENE GLYCOL 3350 17 G/17G
17 POWDER, FOR SOLUTION ORAL DAILY
Status: DISCONTINUED | OUTPATIENT
Start: 2024-04-16 | End: 2024-04-17

## 2024-04-16 RX ORDER — SODIUM CHLORIDE 0.9 % (FLUSH) 0.9 %
5-40 SYRINGE (ML) INJECTION PRN
Status: DISCONTINUED | OUTPATIENT
Start: 2024-04-16 | End: 2024-04-16 | Stop reason: HOSPADM

## 2024-04-16 RX ORDER — PROPOFOL 10 MG/ML
INJECTION, EMULSION INTRAVENOUS PRN
Status: DISCONTINUED | OUTPATIENT
Start: 2024-04-16 | End: 2024-04-16 | Stop reason: SDUPTHER

## 2024-04-16 RX ORDER — ONDANSETRON 2 MG/ML
4 INJECTION INTRAMUSCULAR; INTRAVENOUS
Status: COMPLETED | OUTPATIENT
Start: 2024-04-16 | End: 2024-04-16

## 2024-04-16 RX ORDER — ONDANSETRON 4 MG/1
4 TABLET, ORALLY DISINTEGRATING ORAL EVERY 8 HOURS PRN
Status: DISCONTINUED | OUTPATIENT
Start: 2024-04-16 | End: 2024-04-17

## 2024-04-16 RX ORDER — SODIUM CHLORIDE 0.9 % (FLUSH) 0.9 %
5-40 SYRINGE (ML) INJECTION EVERY 12 HOURS SCHEDULED
Status: DISCONTINUED | OUTPATIENT
Start: 2024-04-16 | End: 2024-04-16 | Stop reason: HOSPADM

## 2024-04-16 RX ORDER — HYDROMORPHONE HYDROCHLORIDE 1 MG/ML
0.5 INJECTION, SOLUTION INTRAMUSCULAR; INTRAVENOUS; SUBCUTANEOUS EVERY 5 MIN PRN
Status: DISCONTINUED | OUTPATIENT
Start: 2024-04-16 | End: 2024-04-16 | Stop reason: HOSPADM

## 2024-04-16 RX ORDER — TRAMADOL HYDROCHLORIDE 50 MG/1
50 TABLET ORAL EVERY 6 HOURS PRN
Status: DISCONTINUED | OUTPATIENT
Start: 2024-04-16 | End: 2024-04-17 | Stop reason: HOSPADM

## 2024-04-16 RX ORDER — SENNA AND DOCUSATE SODIUM 50; 8.6 MG/1; MG/1
1 TABLET, FILM COATED ORAL 2 TIMES DAILY
Status: DISCONTINUED | OUTPATIENT
Start: 2024-04-16 | End: 2024-04-17 | Stop reason: HOSPADM

## 2024-04-16 RX ORDER — SODIUM CHLORIDE 0.9 % (FLUSH) 0.9 %
5-40 SYRINGE (ML) INJECTION PRN
Status: DISCONTINUED | OUTPATIENT
Start: 2024-04-16 | End: 2024-04-17

## 2024-04-16 RX ORDER — ONDANSETRON 2 MG/ML
4 INJECTION INTRAMUSCULAR; INTRAVENOUS EVERY 6 HOURS PRN
Status: DISCONTINUED | OUTPATIENT
Start: 2024-04-16 | End: 2024-04-17 | Stop reason: HOSPADM

## 2024-04-16 RX ORDER — ROPIVACAINE HYDROCHLORIDE 5 MG/ML
INJECTION, SOLUTION EPIDURAL; INFILTRATION; PERINEURAL PRN
Status: DISCONTINUED | OUTPATIENT
Start: 2024-04-16 | End: 2024-04-16 | Stop reason: ALTCHOICE

## 2024-04-16 RX ORDER — MECLIZINE HCL 12.5 MG/1
25 TABLET ORAL PRN
Status: DISCONTINUED | OUTPATIENT
Start: 2024-04-16 | End: 2024-04-17 | Stop reason: HOSPADM

## 2024-04-16 RX ORDER — ACETAMINOPHEN 500 MG
1000 TABLET ORAL ONCE
Status: COMPLETED | OUTPATIENT
Start: 2024-04-16 | End: 2024-04-16

## 2024-04-16 RX ORDER — LIDOCAINE HYDROCHLORIDE 10 MG/ML
1 INJECTION, SOLUTION EPIDURAL; INFILTRATION; INTRACAUDAL; PERINEURAL
Status: DISCONTINUED | OUTPATIENT
Start: 2024-04-16 | End: 2024-04-16 | Stop reason: HOSPADM

## 2024-04-16 RX ORDER — DIAZEPAM 5 MG/1
5 TABLET ORAL EVERY 6 HOURS PRN
Status: DISCONTINUED | OUTPATIENT
Start: 2024-04-16 | End: 2024-04-17

## 2024-04-16 RX ORDER — FENTANYL CITRATE 50 UG/ML
50 INJECTION, SOLUTION INTRAMUSCULAR; INTRAVENOUS EVERY 5 MIN PRN
Status: DISCONTINUED | OUTPATIENT
Start: 2024-04-16 | End: 2024-04-16 | Stop reason: HOSPADM

## 2024-04-16 RX ORDER — MIDAZOLAM HYDROCHLORIDE 1 MG/ML
INJECTION INTRAMUSCULAR; INTRAVENOUS PRN
Status: DISCONTINUED | OUTPATIENT
Start: 2024-04-16 | End: 2024-04-16 | Stop reason: SDUPTHER

## 2024-04-16 RX ORDER — SODIUM CHLORIDE 9 MG/ML
INJECTION, SOLUTION INTRAVENOUS CONTINUOUS
Status: DISCONTINUED | OUTPATIENT
Start: 2024-04-16 | End: 2024-04-17 | Stop reason: HOSPADM

## 2024-04-16 RX ORDER — FENTANYL CITRATE 50 UG/ML
INJECTION, SOLUTION INTRAMUSCULAR; INTRAVENOUS PRN
Status: DISCONTINUED | OUTPATIENT
Start: 2024-04-16 | End: 2024-04-16 | Stop reason: SDUPTHER

## 2024-04-16 RX ORDER — SODIUM CHLORIDE 9 MG/ML
INJECTION, SOLUTION INTRAVENOUS CONTINUOUS
Status: DISCONTINUED | OUTPATIENT
Start: 2024-04-16 | End: 2024-04-17

## 2024-04-16 RX ORDER — LIDOCAINE HYDROCHLORIDE AND EPINEPHRINE 10; 10 MG/ML; UG/ML
INJECTION, SOLUTION INFILTRATION; PERINEURAL PRN
Status: DISCONTINUED | OUTPATIENT
Start: 2024-04-16 | End: 2024-04-16 | Stop reason: ALTCHOICE

## 2024-04-16 RX ORDER — SENNA AND DOCUSATE SODIUM 50; 8.6 MG/1; MG/1
1 TABLET, FILM COATED ORAL 2 TIMES DAILY
Status: DISCONTINUED | OUTPATIENT
Start: 2024-04-16 | End: 2024-04-17

## 2024-04-16 RX ORDER — ACETAMINOPHEN 500 MG
1000 TABLET ORAL EVERY 6 HOURS
Status: DISCONTINUED | OUTPATIENT
Start: 2024-04-16 | End: 2024-04-17

## 2024-04-16 RX ORDER — KETAMINE HCL IN NACL, ISO-OSM 100MG/10ML
SYRINGE (ML) INJECTION PRN
Status: DISCONTINUED | OUTPATIENT
Start: 2024-04-16 | End: 2024-04-16 | Stop reason: SDUPTHER

## 2024-04-16 RX ORDER — LIDOCAINE HYDROCHLORIDE 20 MG/ML
INJECTION, SOLUTION EPIDURAL; INFILTRATION; INTRACAUDAL; PERINEURAL PRN
Status: DISCONTINUED | OUTPATIENT
Start: 2024-04-16 | End: 2024-04-16 | Stop reason: SDUPTHER

## 2024-04-16 RX ORDER — DEXMEDETOMIDINE HYDROCHLORIDE 100 UG/ML
INJECTION, SOLUTION INTRAVENOUS PRN
Status: DISCONTINUED | OUTPATIENT
Start: 2024-04-16 | End: 2024-04-16 | Stop reason: SDUPTHER

## 2024-04-16 RX ORDER — HYDROMORPHONE HYDROCHLORIDE 1 MG/ML
0.5 INJECTION, SOLUTION INTRAMUSCULAR; INTRAVENOUS; SUBCUTANEOUS
Status: DISCONTINUED | OUTPATIENT
Start: 2024-04-16 | End: 2024-04-17

## 2024-04-16 RX ORDER — FAMOTIDINE 20 MG/1
20 TABLET, FILM COATED ORAL 2 TIMES DAILY
Status: DISCONTINUED | OUTPATIENT
Start: 2024-04-16 | End: 2024-04-17 | Stop reason: HOSPADM

## 2024-04-16 RX ORDER — NALOXONE HYDROCHLORIDE 0.4 MG/ML
INJECTION, SOLUTION INTRAMUSCULAR; INTRAVENOUS; SUBCUTANEOUS PRN
Status: DISCONTINUED | OUTPATIENT
Start: 2024-04-16 | End: 2024-04-16 | Stop reason: HOSPADM

## 2024-04-16 RX ORDER — ROCURONIUM BROMIDE 10 MG/ML
INJECTION, SOLUTION INTRAVENOUS PRN
Status: DISCONTINUED | OUTPATIENT
Start: 2024-04-16 | End: 2024-04-16 | Stop reason: SDUPTHER

## 2024-04-16 RX ORDER — PROCHLORPERAZINE EDISYLATE 5 MG/ML
5 INJECTION INTRAMUSCULAR; INTRAVENOUS
Status: DISCONTINUED | OUTPATIENT
Start: 2024-04-16 | End: 2024-04-16 | Stop reason: HOSPADM

## 2024-04-16 RX ORDER — ONDANSETRON 2 MG/ML
4 INJECTION INTRAMUSCULAR; INTRAVENOUS EVERY 6 HOURS PRN
Status: DISCONTINUED | OUTPATIENT
Start: 2024-04-16 | End: 2024-04-17

## 2024-04-16 RX ORDER — ACETAMINOPHEN 500 MG
1000 TABLET ORAL EVERY 6 HOURS
Status: DISCONTINUED | OUTPATIENT
Start: 2024-04-16 | End: 2024-04-17 | Stop reason: HOSPADM

## 2024-04-16 RX ORDER — PREGABALIN 150 MG/1
150 CAPSULE ORAL ONCE
Status: COMPLETED | OUTPATIENT
Start: 2024-04-16 | End: 2024-04-16

## 2024-04-16 RX ORDER — ALBUMIN, HUMAN INJ 5% 5 %
SOLUTION INTRAVENOUS PRN
Status: DISCONTINUED | OUTPATIENT
Start: 2024-04-16 | End: 2024-04-16 | Stop reason: SDUPTHER

## 2024-04-16 RX ORDER — HYDROMORPHONE HYDROCHLORIDE 1 MG/ML
0.5 INJECTION, SOLUTION INTRAMUSCULAR; INTRAVENOUS; SUBCUTANEOUS
Status: ACTIVE | OUTPATIENT
Start: 2024-04-16 | End: 2024-04-17

## 2024-04-16 RX ORDER — SODIUM CHLORIDE 0.9 % (FLUSH) 0.9 %
5-40 SYRINGE (ML) INJECTION PRN
Status: DISCONTINUED | OUTPATIENT
Start: 2024-04-16 | End: 2024-04-17 | Stop reason: HOSPADM

## 2024-04-16 RX ORDER — PANTOPRAZOLE SODIUM 40 MG/1
40 TABLET, DELAYED RELEASE ORAL
Status: DISCONTINUED | OUTPATIENT
Start: 2024-04-17 | End: 2024-04-17 | Stop reason: HOSPADM

## 2024-04-16 RX ORDER — DEXAMETHASONE SODIUM PHOSPHATE 4 MG/ML
INJECTION, SOLUTION INTRA-ARTICULAR; INTRALESIONAL; INTRAMUSCULAR; INTRAVENOUS; SOFT TISSUE PRN
Status: DISCONTINUED | OUTPATIENT
Start: 2024-04-16 | End: 2024-04-16 | Stop reason: SDUPTHER

## 2024-04-16 RX ORDER — VITAMIN B COMPLEX
5000 TABLET ORAL DAILY
Status: DISCONTINUED | OUTPATIENT
Start: 2024-04-16 | End: 2024-04-17 | Stop reason: HOSPADM

## 2024-04-16 RX ADMIN — MIDAZOLAM HYDROCHLORIDE 2 MG: 1 INJECTION, SOLUTION INTRAMUSCULAR; INTRAVENOUS at 08:50

## 2024-04-16 RX ADMIN — Medication 120 MCG: at 09:38

## 2024-04-16 RX ADMIN — Medication 10 MG: at 10:44

## 2024-04-16 RX ADMIN — TRAMADOL HYDROCHLORIDE 50 MG: 50 TABLET ORAL at 13:38

## 2024-04-16 RX ADMIN — SODIUM CHLORIDE, PRESERVATIVE FREE 10 ML: 5 INJECTION INTRAVENOUS at 21:49

## 2024-04-16 RX ADMIN — POLYETHYLENE GLYCOL 3350 17 G: 17 POWDER, FOR SOLUTION ORAL at 13:38

## 2024-04-16 RX ADMIN — ROCURONIUM BROMIDE 40 MG: 10 INJECTION INTRAVENOUS at 08:55

## 2024-04-16 RX ADMIN — GABAPENTIN 200 MG: 100 CAPSULE ORAL at 21:30

## 2024-04-16 RX ADMIN — DIAZEPAM 5 MG: 5 TABLET ORAL at 21:49

## 2024-04-16 RX ADMIN — FAMOTIDINE 20 MG: 20 TABLET, FILM COATED ORAL at 21:43

## 2024-04-16 RX ADMIN — Medication 120 MCG: at 10:36

## 2024-04-16 RX ADMIN — Medication 120 MCG: at 09:18

## 2024-04-16 RX ADMIN — CYCLOBENZAPRINE 10 MG: 10 TABLET, FILM COATED ORAL at 12:17

## 2024-04-16 RX ADMIN — PREGABALIN 150 MG: 75 CAPSULE ORAL at 07:56

## 2024-04-16 RX ADMIN — ONDANSETRON HYDROCHLORIDE 4 MG: 2 INJECTION, SOLUTION INTRAMUSCULAR; INTRAVENOUS at 11:03

## 2024-04-16 RX ADMIN — Medication 80 MCG: at 09:27

## 2024-04-16 RX ADMIN — SUGAMMADEX 200 MG: 100 INJECTION, SOLUTION INTRAVENOUS at 11:11

## 2024-04-16 RX ADMIN — MUPIROCIN: 20 OINTMENT TOPICAL at 21:42

## 2024-04-16 RX ADMIN — ROCURONIUM BROMIDE 10 MG: 10 INJECTION INTRAVENOUS at 09:23

## 2024-04-16 RX ADMIN — ACETAMINOPHEN 1000 MG: 500 TABLET ORAL at 07:57

## 2024-04-16 RX ADMIN — DEXMEDETOMIDINE HYDROCHLORIDE 6 MCG: 100 INJECTION, SOLUTION, CONCENTRATE INTRAVENOUS at 11:11

## 2024-04-16 RX ADMIN — ACETAMINOPHEN 1000 MG: 500 TABLET ORAL at 18:59

## 2024-04-16 RX ADMIN — ALBUMIN (HUMAN) 12.5 G: 12.5 INJECTION, SOLUTION INTRAVENOUS at 10:16

## 2024-04-16 RX ADMIN — TRAMADOL HYDROCHLORIDE 50 MG: 50 TABLET ORAL at 21:42

## 2024-04-16 RX ADMIN — Medication 20 MG: at 09:13

## 2024-04-16 RX ADMIN — WATER 2000 MG: 1 INJECTION INTRAMUSCULAR; INTRAVENOUS; SUBCUTANEOUS at 13:39

## 2024-04-16 RX ADMIN — WATER 2000 MG: 1 INJECTION INTRAMUSCULAR; INTRAVENOUS; SUBCUTANEOUS at 18:58

## 2024-04-16 RX ADMIN — FAMOTIDINE 20 MG: 20 TABLET, FILM COATED ORAL at 13:38

## 2024-04-16 RX ADMIN — DEXAMETHASONE SODIUM PHOSPHATE 8 MG: 4 INJECTION, SOLUTION INTRAMUSCULAR; INTRAVENOUS at 09:18

## 2024-04-16 RX ADMIN — SODIUM CHLORIDE, POTASSIUM CHLORIDE, SODIUM LACTATE AND CALCIUM CHLORIDE: 600; 310; 30; 20 INJECTION, SOLUTION INTRAVENOUS at 08:50

## 2024-04-16 RX ADMIN — Medication 80 MCG: at 10:05

## 2024-04-16 RX ADMIN — Medication 120 MCG: at 09:13

## 2024-04-16 RX ADMIN — SODIUM CHLORIDE, POTASSIUM CHLORIDE, SODIUM LACTATE AND CALCIUM CHLORIDE: 600; 310; 30; 20 INJECTION, SOLUTION INTRAVENOUS at 10:10

## 2024-04-16 RX ADMIN — FENTANYL CITRATE 50 MCG: 50 INJECTION, SOLUTION INTRAMUSCULAR; INTRAVENOUS at 11:09

## 2024-04-16 RX ADMIN — SODIUM CHLORIDE: 9 INJECTION, SOLUTION INTRAVENOUS at 13:37

## 2024-04-16 RX ADMIN — SENNOSIDES AND DOCUSATE SODIUM 1 TABLET: 50; 8.6 TABLET ORAL at 13:38

## 2024-04-16 RX ADMIN — ROCURONIUM BROMIDE 10 MG: 10 INJECTION INTRAVENOUS at 10:52

## 2024-04-16 RX ADMIN — WATER 2000 MG: 1 INJECTION INTRAMUSCULAR; INTRAVENOUS; SUBCUTANEOUS at 21:43

## 2024-04-16 RX ADMIN — DEXMEDETOMIDINE HYDROCHLORIDE 4 MCG: 100 INJECTION, SOLUTION, CONCENTRATE INTRAVENOUS at 11:03

## 2024-04-16 RX ADMIN — WATER 2000 MG: 1 INJECTION INTRAMUSCULAR; INTRAVENOUS; SUBCUTANEOUS at 09:18

## 2024-04-16 RX ADMIN — SENNOSIDES AND DOCUSATE SODIUM 1 TABLET: 50; 8.6 TABLET ORAL at 21:43

## 2024-04-16 RX ADMIN — FENTANYL CITRATE 50 MCG: 50 INJECTION, SOLUTION INTRAMUSCULAR; INTRAVENOUS at 08:54

## 2024-04-16 RX ADMIN — ONDANSETRON 4 MG: 2 INJECTION INTRAMUSCULAR; INTRAVENOUS at 12:19

## 2024-04-16 RX ADMIN — SODIUM CHLORIDE, POTASSIUM CHLORIDE, SODIUM LACTATE AND CALCIUM CHLORIDE: 600; 310; 30; 20 INJECTION, SOLUTION INTRAVENOUS at 07:51

## 2024-04-16 RX ADMIN — ACETAMINOPHEN 1000 MG: 500 TABLET ORAL at 13:39

## 2024-04-16 RX ADMIN — Medication 3 AMPULE: at 07:58

## 2024-04-16 RX ADMIN — PROPOFOL 150 MG: 10 INJECTION, EMULSION INTRAVENOUS at 08:54

## 2024-04-16 RX ADMIN — ROCURONIUM BROMIDE 10 MG: 10 INJECTION INTRAVENOUS at 10:05

## 2024-04-16 RX ADMIN — LIDOCAINE HYDROCHLORIDE 100 MG: 20 INJECTION, SOLUTION EPIDURAL; INFILTRATION; INTRACAUDAL; PERINEURAL at 08:54

## 2024-04-16 ASSESSMENT — PAIN - FUNCTIONAL ASSESSMENT
PAIN_FUNCTIONAL_ASSESSMENT: 0-10
PAIN_FUNCTIONAL_ASSESSMENT: PREVENTS OR INTERFERES WITH MANY ACTIVE NOT PASSIVE ACTIVITIES

## 2024-04-16 ASSESSMENT — PAIN DESCRIPTION - PAIN TYPE
TYPE: SURGICAL PAIN

## 2024-04-16 ASSESSMENT — PAIN DESCRIPTION - LOCATION
LOCATION: BACK
LOCATION: BACK;LEG
LOCATION: BACK
LOCATION: BACK

## 2024-04-16 ASSESSMENT — PAIN SCALES - GENERAL
PAINLEVEL_OUTOF10: 6
PAINLEVEL_OUTOF10: 5
PAINLEVEL_OUTOF10: 8
PAINLEVEL_OUTOF10: 8
PAINLEVEL_OUTOF10: 5

## 2024-04-16 ASSESSMENT — PAIN DESCRIPTION - ONSET
ONSET: ON-GOING

## 2024-04-16 ASSESSMENT — LIFESTYLE VARIABLES: SMOKING_STATUS: 1

## 2024-04-16 ASSESSMENT — PAIN DESCRIPTION - ORIENTATION
ORIENTATION: LOWER
ORIENTATION: MID;LOWER
ORIENTATION: MID;LOWER
ORIENTATION: LOWER

## 2024-04-16 ASSESSMENT — PAIN DESCRIPTION - DESCRIPTORS
DESCRIPTORS: ACHING
DESCRIPTORS: ACHING
DESCRIPTORS: ACHING;BURNING;NUMBNESS
DESCRIPTORS: ACHING;BURNING;DISCOMFORT
DESCRIPTORS: ACHING;DISCOMFORT

## 2024-04-16 ASSESSMENT — PAIN DESCRIPTION - FREQUENCY
FREQUENCY: INTERMITTENT
FREQUENCY: INTERMITTENT
FREQUENCY: CONTINUOUS

## 2024-04-16 NOTE — ANESTHESIA POSTPROCEDURE EVALUATION
Department of Anesthesiology  Postprocedure Note    Patient: Miley Shane  MRN: [id number]  YOB: 1965  Date of evaluation: 4/16/2024    Procedure Summary       Date: 04/16/24 Room / Location: [facility] MAIN OR 8 / [facility] MAIN OR    Anesthesia Start: 0850 Anesthesia Stop: 1125    Procedure: REVISION L4-S1 POSTERIOR DECOMPRESSION AND FUSION (Spine Lumbar) Diagnosis:       Lumbar disc disease with radiculopathy      Thoracic spine pain      History of lumbar fusion      Right sided sciatica      Herniated lumbar intervertebral disc      Lumbar pain      (Lumbar disc disease with radiculopathy [M51.16])      (Thoracic spine pain [M54.6])      (History of lumbar fusion [Z98.1])      (Right sided sciatica [M54.31])      (Herniated lumbar intervertebral disc [M51.26])      (Lumbar pain [M54.50])    Providers: Jose Taylor MD Responsible Provider: Deepak Kaufman MD    Anesthesia Type: General ASA Status: 2            Anesthesia Type: General    Lillie Phase I: Lillie Score: 9    Lillie Phase II:      Anesthesia Post Evaluation    Patient location during evaluation: PACU  Patient participation: complete - patient participated  Level of consciousness: sleepy but conscious and responsive to verbal stimuli  Airway patency: patent  Nausea & Vomiting: no vomiting and no nausea  Cardiovascular status: blood pressure returned to baseline and hemodynamically stable  Respiratory status: acceptable  Hydration status: stable    No notable events documented.

## 2024-04-16 NOTE — PERIOP NOTE
06:32= ambulated independently to Pre Op; A&Ox4, consents verified (3); changed into gown using CHG; voided in BR; no mepilex dsg applied d/t procedure type; warming blanket applied.     06:55= music therapy (Smooth Jazz) playing to calm prior to surgery.    07:30= Dr. Taylor in to discuss Surgery.    08:08= Dr. Kaufman in to discuss Anesthesia.    08:13= ready for OR; music playing, lights dimmed, call bell in reach.     08:17= assisted up to BR to void then back to bed; warming blanket applied.    08:20= requested IV be started in non-dominant hand \"in case I have to poop; I wipe with my right hand.\" David Marti, RN, restarted IV in left wrist after two attempts.

## 2024-04-16 NOTE — BRIEF OP NOTE
Brief Postoperative Note      Patient: Miley Shane  YOB: 1965  MRN: [id number]    Date of Procedure: 4/16/2024    Pre-Op Diagnosis Codes:     * Lumbar disc disease with radiculopathy [M51.16]     * Thoracic spine pain [M54.6]     * History of lumbar fusion [Z98.1]     * Right sided sciatica [M54.31]     * Herniated lumbar intervertebral disc [M51.26]     * Lumbar pain [M54.50]    Post-Op Diagnosis: Post-Op Diagnosis Codes:     * Lumbar disc disease with radiculopathy [M51.16]     * Thoracic spine pain [M54.6]     * History of lumbar fusion [Z98.1]     * Right sided sciatica [M54.31]     * Herniated lumbar intervertebral disc [M51.26]     * Lumbar pain [M54.50]       Procedure(s):  REVISION L4-S1 POSTERIOR DECOMPRESSION AND FUSION (GLOBUS)    Surgeon(s):  Jose Taylor MD    Assistant:  * No surgical staff found *    Anesthesia: Choice    Estimated Blood Loss (mL): less than 50     Complications: None    Specimens:   * No specimens in log *    Implants:  * No implants in log *      Drains: * No LDAs found *    Findings:  Infection Present At Time Of Surgery (PATOS) (choose all levels that have infection present):  No infection present  Other Findings:     Electronically signed by Jose Taylor MD on 4/16/2024 at 9:03 AM  
[employer]  N/A 1 Implanted   RENEE SPNL PRECONTR 5.5X60 MM MARINER MIS - [id number]  RENEE SPNL PRECONTR 5.5X60 MM [employer]  N/A 1 Implanted         Drains:   Urinary Catheter 04/16/24 Sandhu (Active)       Findings:  Infection Present At Time Of Surgery (PATOS) (choose all levels that have infection present):  No infection present  Other Findings:     Electronically signed by Jose Taylor MD on 4/16/2024 at 11:04 AM

## 2024-04-16 NOTE — PLAN OF CARE
Problem: Physical Therapy - Adult  Goal: By Discharge: Performs mobility at highest level of function for planned discharge setting.  See evaluation for individualized goals.  Description: FUNCTIONAL STATUS PRIOR TO ADMISSION: Patient was independent and active with occasional use of cane and the patient was independent for basic and instrumental ADLs.    HOME SUPPORT PRIOR TO ADMISSION: The patient lived with male significant other but did not require assistance. 1 story home with ramped entry.    Physical Therapy Goals  Initiated 4/16/2024  1.  Patient will move from supine to sit and sit to supine, scoot up and down, and roll side to side in bed with modified independence within 4 day(s).    2.  Patient will perform sit to stand with modified independence within 4 day(s).  3.  Patient will transfer from bed to chair and chair to bed with independence using the least restrictive device within 4 day(s).  4.  Patient will ambulate with independence for 350 feet with the least restrictive device within 4 day(s).   5.  Patient will verbalize and demonstrate understanding of spinal precautions (No bending, lifting greater than 5 lbs, or twisting; log-roll technique; frequent repositioning as instructed) within 4 days.     Outcome: Not Progressing     PHYSICAL THERAPY EVALUATION    Patient: Miley Shane (58 y.o. female)  Date: 4/16/2024  Primary Diagnosis: Lumbar disc disease with radiculopathy [M51.16]  Thoracic spine pain [M54.6]  History of lumbar fusion [Z98.1]  Right sided sciatica [M54.31]  Herniated lumbar intervertebral disc [M51.26]  Lumbar pain [M54.50]  Spinal stenosis, lumbar region with neurogenic claudication [M48.062]  Stenosis, cervical spine [M48.02]  Procedure(s) (LRB):  REVISION L4-S1 POSTERIOR DECOMPRESSION AND FUSION (N/A) Day of Surgery   Precautions: Restrictions/Precautions: Up as Tolerated, Fall Risk, Bed Alarm  Required Braces or Orthoses?: Yes Required Braces or Orthoses?: Yes

## 2024-04-16 NOTE — H&P
Progress Notes  Julito Treviño PA-C (Physician Assistant)  Physician Assistant  Cosigned by: Jose Taylor MD at 3/6/2024  5:34 PM   Expand All Collapse All  ASSESSMENT:  Pain classified as: Lumbar radiculopathy     Psychosocial risk factors:        Impression: LBP and RLE sciatica in the setting of prior L5-S1 PSF and L 4-5 disc herniation on the Right     PLAN:  Our practice treats spine problems with a comprehensive, multi-modal approach involving rehab, non-narcotic medications, injections and surgery (if clinically indicated).  Today we discussed the diagnosis and engaged in the shared decision-making process regarding testing, treatment options, along with risks and benefits.  Miley Shane (and present family/friends) was/were an active participant on this conversation and had all questions satisfactorily answered. The agreed upon treatment plan after today’s visit is as follows:     Rehab: N/A     Medications:  she is going to increase gabapentin to 2 pills at night     Additional workup: N/A     Interventional procedures: Pt not interested at this time     Surgical options: L4-5 lateral with L sided approach and L4-S1 revision posterior decompression and fusion staged. I have discussed the procedure in detail with the patient and mentioned complications, including but not limited to: death, permanent disability, heart attack, stroke, lung injury or infection, blindness, ileus, bladder or bowel problems, ureter injury, bleeding, nerve injury (including numbness, pain and weakness), paralysis (which may be permanent), failure to heal, failure to fuse bone together in fusion procedures, failure to relief symptoms, failure to relief pain, increased pain, need for further surgeries, failure or breakage or hardware, malpositioning of hardware, need to fuse or operate on additional levels determined either during or after surgery, destabilization of the spine (which may require fusion or later surgery),

## 2024-04-16 NOTE — DISCHARGE INSTRUCTIONS
[facility]  Orthopedics    Dr. Jose Taylor    Miley Shane    Discharge Instruction Sheet: POSTERIOR SPINAL FUSION    Pain control:   Typically, we will prescribe a narcotic usually 1-2 tabs every four hours is    sufficient for the pain. Most patients need this only for the first few weeks. You   should discontinue this as the pain decreases.     You should not drive while taking any narcotic pain medications.    Constipation:  Pain medicines and anesthesia can be constipating-this can be prevented by gentle physical activity and drinking plenty of fluid. It should be treated with over-the-counter medications such as Miralax or suppositories, and/or Fleets enema. You should have a bowel movement at least every other day following surgery.    Incision care:  Keep this area clean and dry.   Your dressing is designed to stay in place for 5-7 days.  You will be sent home with one additional dressing to change at that time.  Leave this new dressing in place until our follow up visit in the office in about 10-14 days.      If staples are in place, they should be removed about 14-20 days after surgery.    You may shower with this impermeable dressing in place.     DO NOT take a tub bath or go swimming until cleared by your doctor.   DO NOT apply lotions, oils, or creams to incision.     To increase and promote healing:  Stop Smoking (or at least cut back on smoking).  Eat a well-balanced diet (high in protein and vitamin C)  If your appetite is poor, consider nutritional supplements like Ensure, Glucerna, or [place] Instant Breakfast.  If you are diabetic, controlling you blood sugars is very important to prevent infection and promote wound healing.    Nutrition:  If you were on a supplement such as Ensure or Glucerna) while in the hospital, please continue using them with each meal for the next 30 days.  Eat a well-balanced diet - High in protein, high in

## 2024-04-16 NOTE — FLOWSHEET NOTE
04/16/24 1123   Handoff   Communication Given Periop Handoff/Relief   Handoff phase Phase I receiving   Handoff Given To Murtaza STALEY   Handoff Received From Aram Chua RN   Handoff Communication Face to Face;At bedside         1226    TRANSFER - OUT REPORT:    Verbal report given to Tanna STALEY on Miley Shane  being transferred to 111(unit) for routine post-op       Report consisted of patient’s Situation, Background, Assessment and   Recommendations(SBAR).     Information from the following report(s) Surgery Report, Intake/Output, MAR, and Cardiac Rhythm SR  was reviewed with the receiving nurse.    Opportunity for questions and clarification was provided.      Patient transported with:   O2 @ 2 liters  Registered Nurse    Lines:      This SmartLink retrieves the last documented value for LDA assessment data, retrieved by either LDA type or by LDA group ID.     This SmartLink should be used in a SmartText or SmartPhrase. If one is not available, please contact your .        1230    Pt life partner Jett received post op update [phone]

## 2024-04-16 NOTE — ANESTHESIA PRE PROCEDURE
Department of Anesthesiology  Preprocedure Note       Name:  Miley Shane   Age:  58 y.o.  :  1965                                          MRN:  [id number]         Date:  2024      Surgeon: Surgeon(s):  Jose Taylor MD    Procedure: Procedure(s):  REVISION L4-S1 POSTERIOR DECOMPRESSION AND FUSION (GLOBUS)    Medications prior to admission:   Prior to Admission medications    Medication Sig Start Date End Date Taking? Authorizing Provider   mupirocin (BACTROBAN) 2 % ointment Apply pea sized amount inside of both nostrils twice daily for five days 4/10/24   Lissy Carey, APRN - NP   estradiol (ESTRACE) 0.1 MG/GM vaginal cream Place vaginally Twice a Week 4/3/24   Yesenia Ortiz MD   dimenhyDRINATE (DRAMAMINE) 50 MG tablet Take 1 tablet by mouth as needed (vertigo)    Yesenia Ortiz MD   MAGNESIUM PO Take 1 tablet by mouth daily    Yesenia Ortiz MD   pantoprazole (PROTONIX) 40 MG tablet Take 1 tablet by mouth every morning (before breakfast) 24   Ned Orlando MD   gabapentin (NEURONTIN) 100 MG capsule Take 1 capsule by mouth nightly for 90 days. Intended supply: 90 days Max Daily Amount: 100 mg  Patient taking differently: Take 2 capsules by mouth nightly. Intended supply: 90 days 23  Ned Orlando MD   vitamin D3 (CHOLECALCIFEROL) 125 MCG (5000 UT) TABS tablet Take 1,000 Units by mouth daily    Automatic Reconciliation, Ar       Current medications:    Current Facility-Administered Medications   Medication Dose Route Frequency Provider Last Rate Last Admin   • ceFAZolin (ANCEF) 2,000 mg in sterile water 20 mL IV syringe  2,000 mg IntraVENous Once Jose Taylor MD       • lactated ringers IV soln infusion   IntraVENous Continuous TaranStevie baca MD 25 mL/hr at 24 0755 NoRateChange at 24 0755   • lidocaine PF 1 % injection 1 mL  1 mL IntraDERmal Once PRN Deepak Kaufman MD       • lactated ringers IV soln infusion

## 2024-04-17 ENCOUNTER — HOSPITAL ENCOUNTER (OUTPATIENT)
Facility: HOSPITAL | Age: 59
Setting detail: OBSERVATION
Discharge: HOME | End: 2024-04-20
Attending: ORTHOPAEDIC SURGERY
Payer: COMMERCIAL

## 2024-04-17 VITALS
HEART RATE: 67 BPM | HEIGHT: 64 IN | TEMPERATURE: 98.8 F | DIASTOLIC BLOOD PRESSURE: 69 MMHG | BODY MASS INDEX: 23.6 KG/M2 | OXYGEN SATURATION: 93 % | WEIGHT: 138.23 LBS | SYSTOLIC BLOOD PRESSURE: 114 MMHG | RESPIRATION RATE: 16 BRPM

## 2024-04-17 LAB
ANION GAP SERPL CALC-SCNC: 3 MMOL/L (ref 5–15)
BUN SERPL-MCNC: 16 MG/DL (ref 6–20)
BUN/CREAT SERPL: 24 (ref 12–20)
CALCIUM SERPL-MCNC: 8.1 MG/DL (ref 8.5–10.1)
CHLORIDE SERPL-SCNC: 112 MMOL/L (ref 97–108)
CO2 SERPL-SCNC: 26 MMOL/L (ref 21–32)
CREAT SERPL-MCNC: 0.67 MG/DL (ref 0.55–1.02)
GLUCOSE SERPL-MCNC: 100 MG/DL (ref 65–100)
HCT VFR BLD AUTO: 34.8 % (ref 35–47)
HGB BLD-MCNC: 11.4 G/DL (ref 11.5–16)
POTASSIUM SERPL-SCNC: 4.3 MMOL/L (ref 3.5–5.1)
SODIUM SERPL-SCNC: 141 MMOL/L (ref 136–145)

## 2024-04-17 PROCEDURE — 97165 OT EVAL LOW COMPLEX 30 MIN: CPT

## 2024-04-17 PROCEDURE — 96374 THER/PROPH/DIAG INJ IV PUSH: CPT

## 2024-04-17 PROCEDURE — 6370000000 HC RX 637 (ALT 250 FOR IP): Performed by: PHYSICIAN ASSISTANT

## 2024-04-17 PROCEDURE — 6370000000 HC RX 637 (ALT 250 FOR IP): Performed by: ORTHOPAEDIC SURGERY

## 2024-04-17 PROCEDURE — 85014 HEMATOCRIT: CPT

## 2024-04-17 PROCEDURE — 97116 GAIT TRAINING THERAPY: CPT

## 2024-04-17 PROCEDURE — G0378 HOSPITAL OBSERVATION PER HR: HCPCS

## 2024-04-17 PROCEDURE — 85018 HEMOGLOBIN: CPT

## 2024-04-17 PROCEDURE — 2580000003 HC RX 258: Performed by: ORTHOPAEDIC SURGERY

## 2024-04-17 PROCEDURE — 36415 COLL VENOUS BLD VENIPUNCTURE: CPT

## 2024-04-17 PROCEDURE — 97535 SELF CARE MNGMENT TRAINING: CPT

## 2024-04-17 PROCEDURE — 80048 BASIC METABOLIC PNL TOTAL CA: CPT

## 2024-04-17 PROCEDURE — 72100 X-RAY EXAM L-S SPINE 2/3 VWS: CPT

## 2024-04-17 PROCEDURE — 6360000002 HC RX W HCPCS: Performed by: ORTHOPAEDIC SURGERY

## 2024-04-17 RX ORDER — SENNA AND DOCUSATE SODIUM 50; 8.6 MG/1; MG/1
1 TABLET, FILM COATED ORAL 2 TIMES DAILY
Qty: 20 TABLET | Refills: 0 | Status: SHIPPED | OUTPATIENT
Start: 2024-04-17

## 2024-04-17 RX ORDER — CYCLOBENZAPRINE HCL 10 MG
10 TABLET ORAL EVERY 12 HOURS PRN
Qty: 20 TABLET | Refills: 0 | Status: SHIPPED | OUTPATIENT
Start: 2024-04-17 | End: 2024-04-27

## 2024-04-17 RX ORDER — TRAMADOL HYDROCHLORIDE 50 MG/1
50 TABLET ORAL EVERY 6 HOURS PRN
Qty: 20 TABLET | Refills: 0 | Status: SHIPPED | OUTPATIENT
Start: 2024-04-17 | End: 2024-04-24

## 2024-04-17 RX ADMIN — ACETAMINOPHEN 1000 MG: 500 TABLET ORAL at 08:18

## 2024-04-17 RX ADMIN — SENNOSIDES AND DOCUSATE SODIUM 1 TABLET: 50; 8.6 TABLET ORAL at 08:18

## 2024-04-17 RX ADMIN — FAMOTIDINE 20 MG: 20 TABLET, FILM COATED ORAL at 08:18

## 2024-04-17 RX ADMIN — MUPIROCIN: 20 OINTMENT TOPICAL at 11:12

## 2024-04-17 RX ADMIN — TRAMADOL HYDROCHLORIDE 50 MG: 50 TABLET ORAL at 06:24

## 2024-04-17 RX ADMIN — ONDANSETRON 4 MG: 2 INJECTION INTRAMUSCULAR; INTRAVENOUS at 08:23

## 2024-04-17 RX ADMIN — ACETAMINOPHEN 1000 MG: 500 TABLET ORAL at 02:20

## 2024-04-17 RX ADMIN — ACETAMINOPHEN 1000 MG: 500 TABLET ORAL at 14:01

## 2024-04-17 RX ADMIN — Medication 400 MG: at 08:18

## 2024-04-17 RX ADMIN — WATER 2000 MG: 1 INJECTION INTRAMUSCULAR; INTRAVENOUS; SUBCUTANEOUS at 06:25

## 2024-04-17 RX ADMIN — Medication 5000 UNITS: at 08:18

## 2024-04-17 RX ADMIN — PANTOPRAZOLE SODIUM 40 MG: 40 TABLET, DELAYED RELEASE ORAL at 06:22

## 2024-04-17 ASSESSMENT — PAIN SCALES - GENERAL
PAINLEVEL_OUTOF10: 5
PAINLEVEL_OUTOF10: 5
PAINLEVEL_OUTOF10: 4

## 2024-04-17 NOTE — CARE COORDINATION
1555 - [facility] has accepted for home health. Pt will be notified, as s/o arrived early and pt has discharged.    Initial note - Met with pt at bedside to discuss recommendation for home health; pt stated she has never used HH services before but is agreeable to have them arranged for discharge. Discussed insurance as potential limiting factor, pt verbalized understanding, no preference for agency, agreeable to CM sending multiple referrals to check for insurance and staffing coverage; if multiple acceptances, CM will bring options for pt to get choice, if only one acceptance, pt agreeable to this being her preference instead of CM sending additional referrals. Pt's significant other to pick her up, will be at [facility] at 1600. Referrals sent.    Olga Miramontes, [facility]  Care Management  [phone]

## 2024-04-17 NOTE — PROGRESS NOTES
04/16/24 1543 04/16/24 1551 04/16/24 1557   Vital Signs   Pulse 80 79 87   /72 121/79 116/73   MAP (Calculated) 92 93 87   MAP (mmHg) 87 93 87   BP Location Right upper arm  --   --    Patient Position Supine Sitting Standing   Oxygen Therapy   SpO2 97 % 95 %  --    O2 Device Nasal cannula None (Room air)  --        Dirk Olson PT    
DISCHARGE NOTE FROM ORTHO NURSE    Patient determined to be stable for discharge by attending provider. I have reviewed the discharge instructions with the patient and spouse. They verbalized understanding and all questions were answered to their satisfaction. No complaints or further questions were expressed.      Medications sent to pharmacy. Appropriate educational materials and medication side effect teaching were provided.      PIV were removed prior to discharge.     Patient did not discharge with any line, verduzco, or drain.    Personal items and valuables accounted for at discharge by patient and/or family: Yes    Post-op patient: Yes-Patient given post-op discharge kit and instructed on use.    Aleyda Diaz LPN    
Department of Orthopedic Surgery  Spine Service  Physician Assistant Progress Note        Subjective:  c/o pain in the low back. RLE sciatic pain improved s/p L4-S1 revision posterior decompression and fusion. No TLIF  Notes some numbness and tingling in the face. States improved somewhat today and noticed similar following surgery in the past  Tolerating po  + voiding via purewick  Tramadol for pain  Vitals  VITALS:  /61   Pulse 67   Temp 97.7 °F (36.5 °C) (Oral)   Resp 16   Ht 1.626 m (5' 4\")   Wt 62.7 kg (138 lb 3.7 oz)   SpO2 95%   BMI 23.73 kg/m²     PHYSICAL EXAM:    Orientation:  alert and oriented to person, place and time    Incision:  dressing in place, clean, dry, and intact  Lower Extremity Motor :  quadriceps, extensor hallucis longus, dorsiflexion, plantarflexion 5/5 bilaterally  Lower Extremity Sensory:  Intact L1-S1  ABNORMAL EXAM FINDINGS:  none    LABS:    HgB:    Lab Results   Component Value Date/Time    HGB 11.4 04/17/2024 06:17 AM     CBC with Differential:    Lab Results   Component Value Date/Time    WBC 11.2 04/01/2024 11:53 AM    RBC 4.71 04/01/2024 11:53 AM    HGB 11.4 04/17/2024 06:17 AM    HCT 34.8 04/17/2024 06:17 AM     04/01/2024 11:53 AM    MCV 90.7 04/01/2024 11:53 AM    MCH 29.9 04/01/2024 11:53 AM    MCHC 33.0 04/01/2024 11:53 AM    RDW 13.4 04/01/2024 11:53 AM    NRBC 0.0 04/01/2024 11:53 AM    NRBC 0.00 04/01/2024 11:53 AM    LYMPHOPCT 24 03/13/2023 11:00 AM    MONOPCT 8 03/13/2023 11:00 AM    BASOPCT 1 03/13/2023 11:00 AM    MONOSABS 0.9 03/13/2023 11:00 AM    LYMPHSABS 2.7 03/13/2023 11:00 AM    EOSABS 0.1 03/13/2023 11:00 AM    BASOSABS 0.1 03/13/2023 11:00 AM    DIFFTYPE AUTOMATED 01/09/2023 02:30 PM       ASSESSMENT AND PLAN:    Post operative day 1 status post L4-S1 revision posterior decompression and fusion . No tlif    1:  Sciatic pain improved. Expected low back pain.  2:  Activity Level:  PT/OT. LSO. WBAT  3:  Pain Control:  sched tylenol. Prn 
End of Shift Note    Bedside shift change report given to CHRIS Maynard (oncoming nurse) by Yesica Leonard RN (offgoing nurse).  Report included the following information SBAR, Kardex, Procedure Summary, Intake/Output, and MAR    Shift worked:  7p-7a     Shift summary and any significant changes:     Pt mentioned her face/head had some numbness and tingling. It did not worsen overnight and pt believes it may be from the anesthesia because she recalls feeling the same way after previous surgeries. Pain well managed. No other significant changes.      Concerns for physician to address:  none     Zone phone for oncoming shift:   [phone]       Activity:     Number times ambulated in hallways past shift: 0  Number of times OOB to chair past shift: 0    Cardiac:   Cardiac Monitoring: No           Access:  Current line(s): PIV     Genitourinary:   Urinary status: voiding and external catheter    Respiratory:      Chronic home O2 use?: NO  Incentive spirometer at bedside: NO       GI:     Current diet:  ADULT DIET; Regular  Passing flatus: YES  Tolerating current diet: YES       Pain Management:   Patient states pain is manageable on current regimen: YES    Skin:     Interventions: turn team, specialty bed, float heels, increase time out of bed, and PT/OT consult    Patient Safety:  Fall Score:    Interventions: bed/chair alarm, assistive device (walker, cane. etc), gripper socks, and pt to call before getting OOB       Length of Stay:  Expected LOS: 1  Actual LOS: 1      Yesica Leonard RN                            
Ortho / Neurosurgery spine progress Note    POD# 0  s/p REVISION L4-S1 POSTERIOR DECOMPRESSION AND FUSION   Pt seen alone at bed. Reports pain is 6/10. Does not want to take oxy as it makes her nauseous. Willing to try tramadol. Tolerating diet.    Patient in bed    VSS Afebrile.    Visit Vitals  /79   Pulse 68   Temp 97.9 °F (36.6 °C) (Oral)   Resp 16   Ht 1.626 m (5' 4\")   Wt 62.7 kg (138 lb 3.7 oz)   SpO2 95%   BMI 23.73 kg/m²              Labs    Lab Results   Component Value Date/Time    HGB 14.1 04/01/2024 11:53 AM      Lab Results   Component Value Date/Time    INR 1.0 04/08/2024 03:07 PM      Lab Results   Component Value Date/Time     04/01/2024 11:53 AM    K 4.2 04/01/2024 11:53 AM     04/01/2024 11:53 AM    CO2 26 04/01/2024 11:53 AM    BUN 16 04/01/2024 11:53 AM     Recent Glucose Results:   Glucose   Date Value Ref Range Status   04/01/2024 91 65 - 100 mg/dL Final   01/09/2023 90 65 - 100 mg/dL Final           Body mass index is 23.73 kg/m². : A BMI > 30 is classified as obesity and > 40 is classified as morbid obesity.     Awake and alert. No acute distress.    Dressing: Prineo C.D.I.   No significant erythema or swelling  Cryotherapy in place over incision.   BLE sensation to light touch intact  BLE motor intact. Strength 5/5    SCD for mechanical DVT proph while in bed        PLAN:  1) PT BID -Wbat  2) Neurovascular Checks Every 4 hours   3) Pain control - scheduled tylenol  and prn  tramadol    4) Readiness for discharge:     [x] Vital Signs stable    [x] Labs stable    [x] + Voiding    [x] Wound intact, drainage minimal    [x] Tolerating PO intake     [] Cleared by PT (OT if applicable) for discharge   [x] Adequate pain control on oral medication alone       Discharge Plan: Discharge Plan: Home with Home Health     Discharge Needs: GABRIELE Pike PA-C, [facility] [place]    Orthopedic Physician Assistant     
Physical Therapy     Chart reviewed up to date. Attempted to see pt for PT session. Pt SPENSER for imaging, not available. Will defer but continue to follow.    Annelise Anthony PT, DPT, NCS  
reach, and Caregiver / family present    COMMUNICATION/EDUCATION:   The patient's plan of care was discussed with: registered nurse    Patient Education  Education Given To: Patient  Education Provided: Role of Therapy;Plan of Care;Precautions;ADL Adaptive Strategies;Energy Conservation;Transfer Training;Home Exercise Program;Equipment  Education Provided Comments: Ae training  Education Method: Demonstration;Verbal  Barriers to Learning: None  Education Outcome: Verbalized understanding;Demonstrated understanding    Thank you for this referral.  Lizette Bennett OT  Minutes: 30    Occupational Therapy Evaluation Charge Determination   History Examination Decision-Making   LOW Complexity : Brief history review  LOW Complexity: 1-3 Performance deficits relating to physical, cognitive, or psychosocial skills that result in activity limitations and/or participation restrictions LOW Complexity: No comorbidities that affect functional and  no verbal  or physical assist needed to complete eval tasks   Based on the above components, the patient evaluation is determined to be of the following complexity level: Low

## 2024-04-17 NOTE — PLAN OF CARE
Problem: Physical Therapy - Adult  Goal: By Discharge: Performs mobility at highest level of function for planned discharge setting.  See evaluation for individualized goals.  Description: FUNCTIONAL STATUS PRIOR TO ADMISSION: Patient was independent and active with occasional use of cane and the patient was independent for basic and instrumental ADLs.    HOME SUPPORT PRIOR TO ADMISSION: The patient lived with male significant other but did not require assistance. 1 story home with ramped entry.    Physical Therapy Goals  Initiated 4/16/2024  1.  Patient will move from supine to sit and sit to supine, scoot up and down, and roll side to side in bed with modified independence within 4 day(s).    2.  Patient will perform sit to stand with modified independence within 4 day(s).  3.  Patient will transfer from bed to chair and chair to bed with independence using the least restrictive device within 4 day(s).  4.  Patient will ambulate with independence for 350 feet with the least restrictive device within 4 day(s).   5.  Patient will verbalize and demonstrate understanding of spinal precautions (No bending, lifting greater than 5 lbs, or twisting; log-roll technique; frequent repositioning as instructed) within 4 days.   Outcome: Adequate for Discharge   PHYSICAL THERAPY TREATMENT/DISCHARGE    Patient: Miley Shane (58 y.o. female)  Date: 4/17/2024  Diagnosis: Lumbar disc disease with radiculopathy [M51.16]  Thoracic spine pain [M54.6]  History of lumbar fusion [Z98.1]  Right sided sciatica [M54.31]  Herniated lumbar intervertebral disc [M51.26]  Lumbar pain [M54.50]  Spinal stenosis, lumbar region with neurogenic claudication [M48.062]  Stenosis, cervical spine [M48.02] Spinal stenosis, lumbar region with neurogenic claudication  Procedure(s) (LRB):  REVISION L4-S1 POSTERIOR DECOMPRESSION AND FUSION (N/A) 1 Day Post-Op  Precautions: Up as Tolerated, Fall Risk, Bed Alarm               Required Braces or

## 2024-04-17 NOTE — PLAN OF CARE
Problem: Physical Therapy - Adult  Goal: By Discharge: Performs mobility at highest level of function for planned discharge setting.  See evaluation for individualized goals.  Description: FUNCTIONAL STATUS PRIOR TO ADMISSION: Patient was independent and active with occasional use of cane and the patient was independent for basic and instrumental ADLs.    HOME SUPPORT PRIOR TO ADMISSION: The patient lived with male significant other but did not require assistance. 1 story home with ramped entry.    Physical Therapy Goals  Initiated 4/16/2024  1.  Patient will move from supine to sit and sit to supine, scoot up and down, and roll side to side in bed with modified independence within 4 day(s).    2.  Patient will perform sit to stand with modified independence within 4 day(s).  3.  Patient will transfer from bed to chair and chair to bed with independence using the least restrictive device within 4 day(s).  4.  Patient will ambulate with independence for 350 feet with the least restrictive device within 4 day(s).   5.  Patient will verbalize and demonstrate understanding of spinal precautions (No bending, lifting greater than 5 lbs, or twisting; log-roll technique; frequent repositioning as instructed) within 4 days.   4/16/2024 1704 by Dirk Olson, PT  Outcome: Not Progressing     Problem: Physical Therapy - Adult  Goal: By Discharge: Performs mobility at highest level of function for planned discharge setting.  See evaluation for individualized goals.  Description: FUNCTIONAL STATUS PRIOR TO ADMISSION: Patient was independent and active with occasional use of cane and the patient was independent for basic and instrumental ADLs.    HOME SUPPORT PRIOR TO ADMISSION: The patient lived with male significant other but did not require assistance. 1 story home with ramped entry.    Physical Therapy Goals  Initiated 4/16/2024  1.  Patient will move from supine to sit and sit to supine, scoot up and down, and roll side to

## 2024-04-17 NOTE — PLAN OF CARE
Problem: Pain  Goal: Verbalizes/displays adequate comfort level or baseline comfort level  4/17/2024 1218 by Aleyda Diaz LPN  Outcome: Progressing  4/17/2024 0644 by Yesica Leonard RN  Outcome: Progressing  4/17/2024 0644 by Yesica Leonard RN  Outcome: Progressing     Problem: Safety - Adult  Goal: Free from fall injury  4/17/2024 1218 by Aleyda Diaz LPN  Outcome: Progressing  Flowsheets (Taken 4/17/2024 0747)  Free From Fall Injury: Instruct family/caregiver on patient safety  4/17/2024 0644 by Yesica Leonard RN  Outcome: Progressing  4/17/2024 0644 by Yesica Leonard RN  Outcome: Progressing     Problem: ABCDS Injury Assessment  Goal: Absence of physical injury  4/17/2024 1218 by Aleyda Diaz LPN  Outcome: Progressing  4/17/2024 0644 by Yesica Leonard RN  Outcome: Progressing  4/17/2024 0644 by Yesica Leonard RN  Outcome: Progressing

## 2024-04-17 NOTE — DISCHARGE SUMMARY
Spine Discharge Summary    Patient ID:  Miley Shane  [id number]  female  58 y.o.  1965    Admit date: 4/16/2024    Discharge date: 4/17/2024    Admitting Physician: Jose Taylor MD     Consulting Physician(s):   Treatment Team: Attending Provider: Jose Taylor MD; Surgeon: Jose Taylor MD; Utilization Reviewer: Sarina Martinez RN; LPN: Aleyda Diaz LPN; Physical Therapist: Rosemary Hopkins PT; Occupational Therapist: Lizette Bennett OT; Physician Assistant: Annie Pike PA-C; Utilization Reviewer: Antoni Murphy    Date of Surgery:   4/16/2024     Preoperative Diagnosis:  Lumbar disc disease with radiculopathy [M51.16]  Thoracic spine pain [M54.6]  History of lumbar fusion [Z98.1]  Right sided sciatica [M54.31]  Herniated lumbar intervertebral disc [M51.26]  Lumbar pain [M54.50]    Postoperative Diagnosis:   * No post-op diagnosis entered *    Procedure(s):  REVISION L4-S1 POSTERIOR DECOMPRESSION AND FUSION     Anesthesia Type:   Choice     Surgeon: Jose Taylor MD                            HPI:  Pt is a 58 y.o. female who has a history of Lumbar disc disease with radiculopathy [M51.16]  Thoracic spine pain [M54.6]  History of lumbar fusion [Z98.1]  Right sided sciatica [M54.31]  Herniated lumbar intervertebral disc [M51.26]  Lumbar pain [M54.50]  with pain and limitations of activities of daily living who presents at this time for a REVISION L4-S1 POSTERIOR DECOMPRESSION AND FUSION  following the failure of conservative management.    PMH:   Past Medical History:   Diagnosis Date    Acid reflux     Anxiety and depression     At risk for sleep apnea 04/08/2024    STOP Bang 3. Admits to loud snoring and admits to witnessed apnea while sleeping    PONV (postoperative nausea and vomiting)     Raynaud's disease     Spinal stenosis     Trouble swallowing 04/08/2024    reports trouble swallowing since esophageal surgery in 10/2023    Vertigo        Body mass index is 23.73 kg/m². : WILDER

## 2024-04-17 NOTE — PLAN OF CARE
Problem: Pain  Goal: Verbalizes/displays adequate comfort level or baseline comfort level  4/17/2024 1244 by Aleyda Diaz LPN  Outcome: Completed  4/17/2024 1244 by Aleyda Diaz LPN  Outcome: Progressing  4/17/2024 1218 by Aleyda Diaz LPN  Outcome: Progressing  4/17/2024 0644 by Yesica Leonard RN  Outcome: Progressing  4/17/2024 0644 by Yesica Leonard RN  Outcome: Progressing     Problem: Safety - Adult  Goal: Free from fall injury  4/17/2024 1244 by Aleyda Diaz LPN  Outcome: Completed  4/17/2024 1244 by Aleyda Diaz LPN  Outcome: Progressing  4/17/2024 1218 by Aleyda Diaz LPN  Outcome: Progressing  Flowsheets (Taken 4/17/2024 0750)  Free From Fall Injury: Instruct family/caregiver on patient safety  4/17/2024 0644 by Yesica Leonard RN  Outcome: Progressing  4/17/2024 0644 by Yesica Leonard RN  Outcome: Progressing     Problem: ABCDS Injury Assessment  Goal: Absence of physical injury  4/17/2024 1244 by Aleyda Diaz LPN  Outcome: Completed  4/17/2024 1244 by Aleyda Diaz LPN  Outcome: Progressing  4/17/2024 1218 by Aleyda Diaz LPN  Outcome: Progressing  4/17/2024 0644 by Yesica Leonard RN  Outcome: Progressing  4/17/2024 0644 by Yesica Leonard RN  Outcome: Progressing

## 2024-04-25 ENCOUNTER — COMMUNITY OUTREACH (OUTPATIENT)
Dept: PRIMARY CARE CLINIC | Facility: CLINIC | Age: 59
End: 2024-04-25

## 2024-06-18 ENCOUNTER — TELEPHONE (OUTPATIENT)
Dept: PRIMARY CARE CLINIC | Facility: CLINIC | Age: 59
End: 2024-06-18

## 2024-06-18 DIAGNOSIS — R19.5 POSITIVE COLORECTAL CANCER SCREENING USING COLOGUARD TEST: Primary | ICD-10-CM

## 2024-06-26 ENCOUNTER — HOSPITAL ENCOUNTER (OUTPATIENT)
Facility: HOSPITAL | Age: 59
Discharge: HOME | End: 2024-06-29
Payer: COMMERCIAL

## 2024-06-26 VITALS — WEIGHT: 138 LBS | HEIGHT: 64 IN | BODY MASS INDEX: 23.56 KG/M2

## 2024-06-26 DIAGNOSIS — Z12.31 ENCOUNTER FOR SCREENING MAMMOGRAM FOR MALIGNANT NEOPLASM OF BREAST: ICD-10-CM

## 2024-06-26 PROCEDURE — 77063 BREAST TOMOSYNTHESIS BI: CPT

## 2024-08-02 SDOH — ECONOMIC STABILITY: INCOME INSECURITY: HOW HARD IS IT FOR YOU TO PAY FOR THE VERY BASICS LIKE FOOD, HOUSING, MEDICAL CARE, AND HEATING?: NOT VERY HARD

## 2024-08-02 SDOH — ECONOMIC STABILITY: FOOD INSECURITY: WITHIN THE PAST 12 MONTHS, THE FOOD YOU BOUGHT JUST DIDN'T LAST AND YOU DIDN'T HAVE MONEY TO GET MORE.: SOMETIMES TRUE

## 2024-08-02 SDOH — ECONOMIC STABILITY: HOUSING INSECURITY
IN THE LAST 12 MONTHS, WAS THERE A TIME WHEN YOU DID NOT HAVE A STEADY PLACE TO SLEEP OR SLEPT IN A SHELTER (INCLUDING NOW)?: NO

## 2024-08-02 SDOH — ECONOMIC STABILITY: FOOD INSECURITY: WITHIN THE PAST 12 MONTHS, YOU WORRIED THAT YOUR FOOD WOULD RUN OUT BEFORE YOU GOT MONEY TO BUY MORE.: SOMETIMES TRUE

## 2024-08-02 SDOH — ECONOMIC STABILITY: TRANSPORTATION INSECURITY
IN THE PAST 12 MONTHS, HAS LACK OF TRANSPORTATION KEPT YOU FROM MEETINGS, WORK, OR FROM GETTING THINGS NEEDED FOR DAILY LIVING?: YES

## 2024-08-05 ENCOUNTER — OFFICE VISIT (OUTPATIENT)
Dept: PRIMARY CARE CLINIC | Facility: CLINIC | Age: 59
End: 2024-08-05
Payer: COMMERCIAL

## 2024-08-05 VITALS
SYSTOLIC BLOOD PRESSURE: 124 MMHG | RESPIRATION RATE: 18 BRPM | HEIGHT: 64 IN | OXYGEN SATURATION: 98 % | BODY MASS INDEX: 23.6 KG/M2 | WEIGHT: 138.2 LBS | HEART RATE: 70 BPM | DIASTOLIC BLOOD PRESSURE: 80 MMHG | TEMPERATURE: 97.8 F

## 2024-08-05 DIAGNOSIS — M48.061 SPINAL STENOSIS AT L4-L5 LEVEL: ICD-10-CM

## 2024-08-05 DIAGNOSIS — M54.9 BACK PAIN WITH HISTORY OF SPINAL SURGERY: ICD-10-CM

## 2024-08-05 DIAGNOSIS — K21.9 GASTROESOPHAGEAL REFLUX DISEASE WITHOUT ESOPHAGITIS: ICD-10-CM

## 2024-08-05 DIAGNOSIS — B35.1 TINEA UNGUIUM: ICD-10-CM

## 2024-08-05 DIAGNOSIS — F41.9 ANXIETY: ICD-10-CM

## 2024-08-05 DIAGNOSIS — Z98.890 BACK PAIN WITH HISTORY OF SPINAL SURGERY: ICD-10-CM

## 2024-08-05 DIAGNOSIS — R19.5 POSITIVE COLORECTAL CANCER SCREENING USING COLOGUARD TEST: Primary | ICD-10-CM

## 2024-08-05 LAB
ALBUMIN SERPL-MCNC: 3.7 G/DL (ref 3.5–5)
ALBUMIN/GLOB SERPL: 1 (ref 1.1–2.2)
ALP SERPL-CCNC: 118 U/L (ref 45–117)
ALT SERPL-CCNC: 17 U/L (ref 12–78)
AST SERPL-CCNC: 14 U/L (ref 15–37)
BILIRUB DIRECT SERPL-MCNC: 0.1 MG/DL (ref 0–0.2)
BILIRUB SERPL-MCNC: 0.5 MG/DL (ref 0.2–1)
GLOBULIN SER CALC-MCNC: 3.8 G/DL (ref 2–4)
PROT SERPL-MCNC: 7.5 G/DL (ref 6.4–8.2)

## 2024-08-05 PROCEDURE — 99214 OFFICE O/P EST MOD 30 MIN: CPT | Performed by: INTERNAL MEDICINE

## 2024-08-05 RX ORDER — HYDROXYZINE HYDROCHLORIDE 10 MG/1
10 TABLET, FILM COATED ORAL
Qty: 90 TABLET | Refills: 1 | Status: SHIPPED | OUTPATIENT
Start: 2024-08-05 | End: 2025-02-01

## 2024-08-05 RX ORDER — TERBINAFINE HYDROCHLORIDE 250 MG/1
250 TABLET ORAL DAILY
Qty: 90 TABLET | Refills: 0 | Status: SHIPPED | OUTPATIENT
Start: 2024-08-05 | End: 2024-11-03

## 2024-08-05 RX ORDER — OMEPRAZOLE 40 MG/1
40 CAPSULE, DELAYED RELEASE ORAL
Qty: 90 CAPSULE | Refills: 1 | Status: SHIPPED | OUTPATIENT
Start: 2024-08-05

## 2024-08-05 NOTE — PROGRESS NOTES
Miley Shane is a 58 y.o. female and presents with     Chief Complaint   Patient presents with    Follow-up       History of Present Illness  The patient presents for evaluation of multiple medical concerns.    She has a history of kidney transplant and is under the care of a nephrology team at [facility]. She recently underwent back surgery, which has significantly improved her condition, although she still experiences some pain. The procedure involved the removal of old rods and screws from a previous surgery and the fusion of a new disc. She has been diagnosed with osteopenia and is currently taking vitamin D supplements.    She reports issues with silent heartburn and indigestion, for which she was prescribed pantoprazole. However, she has not been able to obtain this medication due to insurance issues. Additionally, she mentions a persistent sore throat since her surgery. She has no history of liver problems.    She has been diagnosed with prediabetes and has been managing her condition through dietary changes, including a low-carb diet. Despite these efforts, she has only seen a 1-point drop in her A1c levels over the past year. She expresses concern about her weight, which is currently 138 pounds, and feels that she is overweight. She notes that she has been gaining weight since turning 49.    She is experiencing severe anxiety, which is affecting her sleep. She has a history of PTSD and was previously on antidepressants. She also reports feelings of depression but does not endorse any suicidal thoughts. She has a history of hospitalization for depression. She is unsure if her current symptoms are related to the back pain she has been experiencing.    She is dealing with toenail fungus and is seeking a prescription for medication. She is aware that the medication can have adverse effects on the liver and kidneys and is interested in exploring other treatment options. She also notes the presence of grooves in

## 2024-08-05 NOTE — PATIENT INSTRUCTIONS
[facility] Financial Resources*  (Call [employer]/211 if need more resources.)    Medical Care  [facility] Financial Assistance  What they offer: The [employer] Financial Assistance Program helps uninsured patients who do not qualify for government-sponsored health insurance and cannot afford to pay for their medical care. Insured patients may also qualify for assistance based on family income, family size, and medical needs.  Phone Number: [phone]  How to apply for the [employer] Financial Assistance Program:  Option 1: To apply for financial assistance, a patient (or their family or other provider) should fill out the Financial Assistance Application. Copies of the Financial Assistance Application and the FAP may be obtained for free by calling the [facility] customer service department at [phone].  Option 2: The Financial Assistance Application and policy may be obtained for free by downloading a copy from the [employer] website:  https://www.Livestage/patient-resources/financial-assistance  Applications are available in several languages on the website    [facility] Financial Assistance  What they offer: [facility] has a Financial Assistance Policy that provides free or discounted health care to qualified patients.  Website:  https://Cogbooks/patient-financial/pricing  Phone Number for Patient Benefit Advisors: [phone]    [facility] Financial Assistance  What they offer: Help with understanding a bill, finding out what insurance pays, applying for financial aid, or setting up a payment plan.  Website:  https://MINGDAO.COM/services/billing-insurance/fgfpttcis-bdyyefnhut-chqquvjmoyb  Financial Counseling Call Center: 795.460.6178      [employer]  What they offer:   Mobile healthcare resources for uninsured patients.  Website:  https://www.[employer]ivestage/[facility]/[place]/[facility]-care-a-van  Contact: [phone] between 7:00 - 8:30 am to schedule same

## 2024-08-05 NOTE — PROGRESS NOTES
Health Decision Maker has been checked with the patient      AI form was signed    Chief Complaint   Patient presents with    Follow-up       \"Have you been to the ER, urgent care clinic since your last visit?  Hospitalized since your last visit?\"    YES - When: approximately 5 months ago.  Where and Why: spinal stenosis, [facility].    “Have you seen or consulted any other health care providers outside of [facility] since your last visit?”    NO      There were no vitals filed for this visit.   Depression: Not at risk (4/1/2024)    PHQ-2     PHQ-2 Score: 0              Click Here for Release of Records Request    Chart reviewed: immunizations are documented.   Immunization History   Administered Date(s) Administered    Influenza, FLUARIX, FLULAVAL, FLUZONE (age 6 mo+) AND AFLURIA, (age 3 y+), PF, 0.5mL 01/09/2023    TDaP, ADACEL (age 10y-64y), BOOSTRIX (age 10y+), IM, 0.5mL 10/11/2021

## 2024-08-08 ENCOUNTER — PATIENT MESSAGE (OUTPATIENT)
Dept: PRIMARY CARE CLINIC | Facility: CLINIC | Age: 59
End: 2024-08-08

## 2024-08-08 NOTE — TELEPHONE ENCOUNTER
From: Miley Shane  To: Dr. Ned Orlando  Sent: 8/8/2024 12:44 PM EDT  Subject: Referral, and medicine question    Good morning .I called to make an appointment for the Colonoscopy and was told that you need to fax the referral to there office.  The Terbinafine is making me nauseated, could you please send in something to my [facility] pharmacy in [facility] for that.   Thank you   Miley Shane

## 2024-08-13 DIAGNOSIS — F41.9 ANXIETY: Primary | ICD-10-CM

## 2024-08-13 RX ORDER — ESCITALOPRAM OXALATE 10 MG/1
10 TABLET ORAL DAILY
Qty: 30 TABLET | Refills: 1 | Status: SHIPPED | OUTPATIENT
Start: 2024-08-13

## 2024-09-06 ENCOUNTER — TELEPHONE (OUTPATIENT)
Dept: PRIMARY CARE CLINIC | Facility: CLINIC | Age: 59
End: 2024-09-06

## 2024-09-06 NOTE — TELEPHONE ENCOUNTER
Patient said her pharmacy doesn't have the terbinafine  Patient said her insurance will not pay for omeprazole   Patient asked if Dr. Orlando can prescribe different medication

## 2024-09-08 DIAGNOSIS — K21.9 GASTROESOPHAGEAL REFLUX DISEASE WITHOUT ESOPHAGITIS: Primary | ICD-10-CM

## 2024-09-08 RX ORDER — FAMOTIDINE 40 MG/1
40 TABLET, FILM COATED ORAL EVERY EVENING
Qty: 90 TABLET | Refills: 1 | Status: SHIPPED | OUTPATIENT
Start: 2024-09-08

## 2024-12-09 ENCOUNTER — ANESTHESIA EVENT (OUTPATIENT)
Facility: HOSPITAL | Age: 59
End: 2024-12-09
Payer: COMMERCIAL

## 2024-12-09 ENCOUNTER — ANESTHESIA (OUTPATIENT)
Facility: HOSPITAL | Age: 59
End: 2024-12-09
Payer: COMMERCIAL

## 2024-12-09 ENCOUNTER — HOSPITAL ENCOUNTER (OUTPATIENT)
Facility: HOSPITAL | Age: 59
Setting detail: OUTPATIENT SURGERY
Discharge: HOME | End: 2024-12-09
Attending: INTERNAL MEDICINE | Admitting: INTERNAL MEDICINE
Payer: COMMERCIAL

## 2024-12-09 VITALS
HEART RATE: 59 BPM | RESPIRATION RATE: 20 BRPM | TEMPERATURE: 96.9 F | BODY MASS INDEX: 24.07 KG/M2 | HEIGHT: 64 IN | WEIGHT: 141 LBS | OXYGEN SATURATION: 94 % | SYSTOLIC BLOOD PRESSURE: 128 MMHG | DIASTOLIC BLOOD PRESSURE: 74 MMHG

## 2024-12-09 PROCEDURE — 6360000002 HC RX W HCPCS: Performed by: ANESTHESIOLOGY

## 2024-12-09 PROCEDURE — 3600007512: Performed by: INTERNAL MEDICINE

## 2024-12-09 PROCEDURE — 3700000001 HC ADD 15 MINUTES (ANESTHESIA): Performed by: INTERNAL MEDICINE

## 2024-12-09 PROCEDURE — 88305 TISSUE EXAM BY PATHOLOGIST: CPT

## 2024-12-09 PROCEDURE — 3700000000 HC ANESTHESIA ATTENDED CARE: Performed by: INTERNAL MEDICINE

## 2024-12-09 PROCEDURE — 7100000011 HC PHASE II RECOVERY - ADDTL 15 MIN: Performed by: INTERNAL MEDICINE

## 2024-12-09 PROCEDURE — 2580000003 HC RX 258: Performed by: INTERNAL MEDICINE

## 2024-12-09 PROCEDURE — 3600007502: Performed by: INTERNAL MEDICINE

## 2024-12-09 PROCEDURE — 2709999900 HC NON-CHARGEABLE SUPPLY: Performed by: INTERNAL MEDICINE

## 2024-12-09 PROCEDURE — 7100000010 HC PHASE II RECOVERY - FIRST 15 MIN: Performed by: INTERNAL MEDICINE

## 2024-12-09 RX ORDER — SODIUM CHLORIDE 0.9 % (FLUSH) 0.9 %
5-40 SYRINGE (ML) INJECTION PRN
Status: DISCONTINUED | OUTPATIENT
Start: 2024-12-09 | End: 2024-12-09 | Stop reason: HOSPADM

## 2024-12-09 RX ORDER — LIDOCAINE HYDROCHLORIDE 20 MG/ML
INJECTION, SOLUTION EPIDURAL; INFILTRATION; INTRACAUDAL; PERINEURAL
Status: DISCONTINUED | OUTPATIENT
Start: 2024-12-09 | End: 2024-12-09 | Stop reason: SDUPTHER

## 2024-12-09 RX ORDER — SODIUM CHLORIDE 0.9 % (FLUSH) 0.9 %
5-40 SYRINGE (ML) INJECTION EVERY 12 HOURS SCHEDULED
Status: DISCONTINUED | OUTPATIENT
Start: 2024-12-09 | End: 2024-12-09 | Stop reason: HOSPADM

## 2024-12-09 RX ORDER — SODIUM CHLORIDE 9 MG/ML
INJECTION, SOLUTION INTRAVENOUS PRN
Status: DISCONTINUED | OUTPATIENT
Start: 2024-12-09 | End: 2024-12-09 | Stop reason: HOSPADM

## 2024-12-09 RX ADMIN — SODIUM CHLORIDE: 9 INJECTION, SOLUTION INTRAVENOUS at 12:00

## 2024-12-09 RX ADMIN — PROPOFOL 250 MG: 10 INJECTION, EMULSION INTRAVENOUS at 12:21

## 2024-12-09 RX ADMIN — LIDOCAINE HYDROCHLORIDE 40 MG: 20 INJECTION, SOLUTION EPIDURAL; INFILTRATION; INTRACAUDAL; PERINEURAL at 12:02

## 2024-12-09 ASSESSMENT — PAIN - FUNCTIONAL ASSESSMENT: PAIN_FUNCTIONAL_ASSESSMENT: NONE - DENIES PAIN

## 2024-12-09 ASSESSMENT — LIFESTYLE VARIABLES: SMOKING_STATUS: 1

## 2024-12-09 NOTE — ANESTHESIA POSTPROCEDURE EVALUATION
Department of Anesthesiology  Postprocedure Note    Patient: Miley Shane  MRN: [id number]  YOB: 1965  Date of evaluation: 12/9/2024    Procedure Summary       Date: 12/09/24 Room / Location: [facility] ENDO 03 / [facility] ENDOSCOPY    Anesthesia Start: 1154 Anesthesia Stop: 1223    Procedures:       COLONOSCOPY (Lower GI Region)      COLONOSCOPY POLYPECTOMY SNARE/BIOPSY Diagnosis:       Screen for colon cancer      Positive colorectal cancer screening using Cologuard test      Diverticulosis      Internal hemorrhoids      Colon polyps      (Screen for colon cancer [Z12.11])    Surgeons: Edenilson Coombs MD Responsible Provider: Brissa Evans DO    Anesthesia Type: TIVA ASA Status: 2            Anesthesia Type: TIVA    Lillie Phase I: Lillie Score: 10    Lillie Phase II: Lillie Score: 10    Anesthesia Post Evaluation    Patient location during evaluation: PACU  Patient participation: complete - patient participated  Level of consciousness: awake  Airway patency: patent  Nausea & Vomiting: no vomiting and no nausea  Cardiovascular status: hemodynamically stable  Respiratory status: acceptable  Hydration status: euvolemic    No notable events documented.

## 2024-12-09 NOTE — H&P
[facility]  Outpatient History and Physical    Patient: Miley Shane    Physician: Edenilson Coombs MD    Vital Signs: Blood pressure (!) 148/80, pulse 73, resp. rate 20, height 1.626 m (5' 4\"), weight 64 kg (141 lb), SpO2 98%.    Allergies:   Allergies   Allergen Reactions    Codeine Itching and Rash    Morphine Itching and Rash    Oxycodone-Acetaminophen Itching and Rash       Chief Complaint: high risk screening positive cologuard    History:  Past Medical History:   Diagnosis Date    Acid reflux     Anxiety and depression     At risk for sleep apnea 2024    STOP Bang 3. Admits to loud snoring and admits to witnessed apnea while sleeping    Headache     Hearing loss     PONV (postoperative nausea and vomiting)     Raynaud's disease     Sleep apnea     Spinal stenosis     Trouble swallowing 2024    reports trouble swallowing since esophageal surgery in 10/2023    Vertigo       Past Surgical History:   Procedure Laterality Date    BACK SURGERY      x2    BREAST BIOPSY Left     benign >12 yrs ago    BREAST SURGERY      lumpectomy right breast     SECTION   and     patient had staph infection after  and had several surgeries for that    ESOPHAGUS SURGERY  10/23/2023    left cervical diverticulectomy with moyotomy and EGD for [place] diverticulum    HYSTERECTOMY (CERVIX STATUS UNKNOWN)      Age 24     SPINE SURGERY N/A 2024    REVISION L4-S1 POSTERIOR DECOMPRESSION AND FUSION performed by Jose Taylor MD at [facility] MAIN OR      Social History     Socioeconomic History    Marital status:      Spouse name: None    Number of children: None    Years of education: None    Highest education level: None   Tobacco Use    Smoking status: Former     Current packs/day: 0.00     Average packs/day: 1.2 packs/day for 50.0 years (60.0 ttl pk-yrs)     Types: Cigarettes     Start date: 1975     Quit date: 2015     Years since

## 2024-12-09 NOTE — DISCHARGE INSTRUCTIONS
Miley Shane  [id number]  1965    It was my pleasure seeing you for your procedure.  You will also receive a summary report with the findings from this procedure and any further recommendations.  If you had polyps removed or biopsies taken during your procedure, you will receive a separate letter from me within the next 2 weeks.  If you don't receive this letter or if you have any questions, please call my office [phone].     Please take note of the post procedure instructions listed below.    Dr. Malvin Ford      CARE FOLLOWING YOUR PROCEDURE    These instructions give you information on caring for yourself after your procedure. Call your doctor if you have any problems or questions after your procedure.    GET HELP RIGHT AWAY AND SEEK IMMEDIATE MEDICAL CARE IF:  You have more than a spotting of blood in your stool.  You pass clumps of tissue (blood clots) or fill the toilet with blood.  Your belly is painfully swollen or puffy (abdominal distention).  You throw up (vomit).  You have a fever.  You have redness, pain or swelling at the IV site that last greater than two days.  You have abdominal pain or discomfort that is severe or gets worse throughout the day.    For 24 hours after general anesthesia or intravenous analgesia / sedation  you may experience:  Drowsiness, dizziness, sleepiness, or confusion  Difficulty remembering or delayed reaction times  Difficulty with your balance, especially while walking, move slowly and carefully, do not make sudden position changes  Difficulty focusing or blurred vision    HOME CARE  Walk if you have belly cramping or gas.  Walking will help get rid of the air and reduce the bloated feeling in your belly (abdomen).  Your IV site (where you received drugs) may be tender to touch.  Place warm towels on the site; keep your arm up on two pillows if you have any swelling or soreness in the area.  You may shower.    ACTIVITY:  Take

## 2024-12-09 NOTE — PROGRESS NOTES
ARRIVAL INFORMATION:  Verified patient name and date of birth, scheduled procedure, and informed consent.     : Jett, Significant Other, contact number: [phone]  Physician and staff can share information with the .     Receive texts: yes    Belongings with patient include:  Clothing,None    GI FOCUSED ASSESSMENT:  Neuro: Awake, alert, oriented x4  Respiratory: even and unlabored   GI: soft and non-distended  EKG Rhythm: normal sinus rhythm    Education:Reviewed general discharge instructions and  information.

## 2024-12-09 NOTE — PROGRESS NOTES
Endoscopy Case End Note:    1221:  Procedure scope was pre-cleaned, per protocol, at bedside by LUÍS Madrigal.      1223:  Report received from anesthesia - Dr Evans.  See anesthesia flowsheet for intra-procedure vital signs and events.

## 2024-12-09 NOTE — ANESTHESIA PRE PROCEDURE
Department of Anesthesiology  Preprocedure Note       Name:  Miley Shane   Age:  59 y.o.  :  1965                                          MRN:  [id number]         Date:  2024      Surgeon: Surgeon(s):  Edenilson Coombs MD    Procedure: Procedure(s):  COLONOSCOPY    Medications prior to admission:   Prior to Admission medications    Medication Sig Start Date End Date Taking? Authorizing Provider   famotidine (PEPCID) 40 MG tablet Take 1 tablet by mouth every evening 24   Ned Orlando MD   escitalopram (LEXAPRO) 10 MG tablet Take 1 tablet by mouth daily 24   Ned Orlando MD   omeprazole (PRILOSEC) 40 MG delayed release capsule Take 1 capsule by mouth every morning (before breakfast) 24   Ned Orlando MD   hydrOXYzine HCl (ATARAX) 10 MG tablet Take 1 tablet by mouth nightly 24  Ned Orlando MD   estradiol (ESTRACE) 0.1 MG/GM vaginal cream Place vaginally Twice a Week 4/3/24   ProviderYesenia MD   dimenhyDRINATE (DRAMAMINE) 50 MG tablet Take 1 tablet by mouth as needed (vertigo)    ProviderYesenia MD   MAGNESIUM PO Take 1 tablet by mouth daily    Yesenia Ortiz MD   vitamin D3 (CHOLECALCIFEROL) 125 MCG (5000 UT) TABS tablet Take 1,000 Units by mouth daily    Automatic Reconciliation, Ar       Current medications:    Current Facility-Administered Medications   Medication Dose Route Frequency Provider Last Rate Last Admin    sodium chloride flush 0.9 % injection 5-40 mL  5-40 mL IntraVENous 2 times per day Edenilson Coombs MD        sodium chloride flush 0.9 % injection 5-40 mL  5-40 mL IntraVENous PRN Edenilson Coombs MD        0.9 % sodium chloride infusion   IntraVENous PRN Edenilson Coombs MD           Allergies:    Allergies   Allergen Reactions    Codeine Itching and Rash    Morphine Itching and Rash    Oxycodone-Acetaminophen Itching and Rash       Problem List:    Patient Active Problem List   Diagnosis Code

## 2024-12-09 NOTE — OP NOTE
Colonoscopy Procedure Note      Indications:  high risk screening positive Cologuard     :  Edenilson Coombs MD    Staff: Circulator: Kalli Lee RN  Circulator Assist: Glenda Madrigal RN    Referring Provider: Ned Orlando MD    Sedation:  MAC anesthesia Propofol    Procedure Details:  After informed consent was obtained with all risks and benefits of procedure explained and preoperative exam completed, the patient was taken to the endoscopy suite and placed in the left lateral decubitus position.  Upon sequential sedation as per above, a digital rectal exam was performed per below.  The Olympus videocolonoscope was inserted in the rectum and carefully advanced to the cecum, which was identified by the ileocecal valve and appendiceal orifice, terminal ileum.  The quality of preparation was  good BPS 2/3/3 8 .  The colonoscope was slowly withdrawn with careful evaluation between folds. Retroflexion in the rectum was performed.     Findings:   ABRAHAM: unremarkable  Rectum: small internal hemorrhoid on retroflexion, otherwise unremarkable  Sigmoid: moderate-severe diverticulosis, otherwise unremarkable  Descending Colon: mild-moderate diverticulosis, otherwise unremarkable  Transverse Colon: one 3mm sessile polyp removed with cold snare polypectomy, retrieved, minimal bleeding  - mild diverticulosis  Ascending Colon: one 6mm sessile polyp removed with cold snare polypectomy, retrieved, minimal bleeding  Cecum: normal  no mucosal lesion appreciated  Terminal Ileum: normal  no mucosal lesion appreciated    Complications: None.     EBL:  minimal    Impression:    See Findings above    Recommendations:   - Await pathology. You should receive a letter within 2 weeks.   - Resume normal medications.  - Recommend repeat colonoscopy pending path. She is NOT a candidate for repeat noninvasive colon cancer screening due to polyps found today.  - discussed w/ Jett    Discharge

## 2024-12-16 ENCOUNTER — OFFICE VISIT (OUTPATIENT)
Dept: PRIMARY CARE CLINIC | Facility: CLINIC | Age: 59
End: 2024-12-16
Payer: COMMERCIAL

## 2024-12-16 VITALS
HEIGHT: 64 IN | WEIGHT: 142.8 LBS | RESPIRATION RATE: 12 BRPM | OXYGEN SATURATION: 96 % | HEART RATE: 79 BPM | BODY MASS INDEX: 24.38 KG/M2 | SYSTOLIC BLOOD PRESSURE: 115 MMHG | DIASTOLIC BLOOD PRESSURE: 65 MMHG | TEMPERATURE: 97.1 F

## 2024-12-16 DIAGNOSIS — D12.6 TUBULAR ADENOMA OF COLON: Primary | ICD-10-CM

## 2024-12-16 DIAGNOSIS — B35.1 TINEA UNGUIUM: ICD-10-CM

## 2024-12-16 DIAGNOSIS — Z86.19 HISTORY OF SHINGLES: ICD-10-CM

## 2024-12-16 DIAGNOSIS — K57.30 DIVERTICULOSIS OF COLON: ICD-10-CM

## 2024-12-16 DIAGNOSIS — K64.8 INTERNAL HEMORRHOIDS: ICD-10-CM

## 2024-12-16 PROCEDURE — 99213 OFFICE O/P EST LOW 20 MIN: CPT | Performed by: INTERNAL MEDICINE

## 2024-12-16 RX ORDER — TERBINAFINE HYDROCHLORIDE 250 MG/1
250 TABLET ORAL DAILY
Qty: 90 TABLET | Refills: 0 | Status: SHIPPED | OUTPATIENT
Start: 2024-12-16 | End: 2025-03-16

## 2024-12-16 SDOH — ECONOMIC STABILITY: FOOD INSECURITY: WITHIN THE PAST 12 MONTHS, YOU WORRIED THAT YOUR FOOD WOULD RUN OUT BEFORE YOU GOT MONEY TO BUY MORE.: NEVER TRUE

## 2024-12-16 SDOH — ECONOMIC STABILITY: INCOME INSECURITY: HOW HARD IS IT FOR YOU TO PAY FOR THE VERY BASICS LIKE FOOD, HOUSING, MEDICAL CARE, AND HEATING?: NOT HARD AT ALL

## 2024-12-16 SDOH — ECONOMIC STABILITY: FOOD INSECURITY: WITHIN THE PAST 12 MONTHS, THE FOOD YOU BOUGHT JUST DIDN'T LAST AND YOU DIDN'T HAVE MONEY TO GET MORE.: NEVER TRUE

## 2024-12-16 ASSESSMENT — PATIENT HEALTH QUESTIONNAIRE - PHQ9
2. FEELING DOWN, DEPRESSED OR HOPELESS: NOT AT ALL
SUM OF ALL RESPONSES TO PHQ QUESTIONS 1-9: 0
1. LITTLE INTEREST OR PLEASURE IN DOING THINGS: NOT AT ALL
SUM OF ALL RESPONSES TO PHQ9 QUESTIONS 1 & 2: 0
SUM OF ALL RESPONSES TO PHQ QUESTIONS 1-9: 0

## 2024-12-16 NOTE — PROGRESS NOTES
\"Have you been to the ER, urgent care clinic since your last visit?  Hospitalized since your last visit?\"    NO    “Have you seen or consulted any other health care providers outside of [facility] since your last visit?”    NO            Click Here for Release of Records Request   
months.    PROCEDURE  The patient underwent a colonoscopy, which revealed the presence of an adenomatous polyp in the ascending colon and a hyperplastic polyp. Additionally, she was diagnosed with mild to moderate diverticulosis and internal hemorrhoids.       1. Tubular adenoma of colon  2. Internal hemorrhoids  3. Diverticulosis of colon  4. History of shingles  5. Tinea unguium  -     Comprehensive Metabolic Panel; Future  -     terbinafine (LAMISIL) 250 MG tablet; Take 1 tablet by mouth daily, Disp-90 tablet, R-0Normal         Explained to patient risk benefits of the medications.Advised patient to stop meds if having any side effects.Pt verbalized understanding of the instructions.    I have discussed the diagnosis with the patient and the intended plan as seen in the above orders.  The patient has received an after-visit summary and questions were answered concerning future plans.  I have discussed medication side effects and warnings with the patient as well. I have reviewed the plan of care with the patient, accepted their input and they are in agreement with the treatment goals.     Reviewed plan of care. Patient has provided input and agrees with goals.    No follow-up provider specified.    Ned Orlando MD

## 2024-12-17 LAB
ALBUMIN SERPL-MCNC: 4.3 G/DL (ref 3.8–4.9)
ALP SERPL-CCNC: 114 IU/L (ref 44–121)
ALT SERPL-CCNC: 12 IU/L (ref 0–32)
AST SERPL-CCNC: 14 IU/L (ref 0–40)
BILIRUB SERPL-MCNC: 0.3 MG/DL (ref 0–1.2)
BUN SERPL-MCNC: 21 MG/DL (ref 8–27)
BUN/CREAT SERPL: 30 (ref 12–28)
CALCIUM SERPL-MCNC: 9.4 MG/DL (ref 8.7–10.3)
CHLORIDE SERPL-SCNC: 104 MMOL/L (ref 96–106)
CO2 SERPL-SCNC: 22 MMOL/L (ref 20–29)
CREAT SERPL-MCNC: 0.69 MG/DL (ref 0.57–1)
EGFRCR SERPLBLD CKD-EPI 2021: 99 ML/MIN/1.73
GLOBULIN SER CALC-MCNC: 2.8 G/DL (ref 1.5–4.5)
GLUCOSE SERPL-MCNC: 81 MG/DL (ref 70–99)
POTASSIUM SERPL-SCNC: 4.3 MMOL/L (ref 3.5–5.2)
PROT SERPL-MCNC: 7.1 G/DL (ref 6–8.5)
SODIUM SERPL-SCNC: 142 MMOL/L (ref 134–144)

## 2025-05-15 DIAGNOSIS — K21.9 GASTROESOPHAGEAL REFLUX DISEASE WITHOUT ESOPHAGITIS: ICD-10-CM

## 2025-05-15 RX ORDER — FAMOTIDINE 40 MG/1
40 TABLET, FILM COATED ORAL EVERY EVENING
Qty: 30 TABLET | Refills: 5 | Status: SHIPPED | OUTPATIENT
Start: 2025-05-15

## 2025-06-16 SDOH — ECONOMIC STABILITY: FOOD INSECURITY: WITHIN THE PAST 12 MONTHS, THE FOOD YOU BOUGHT JUST DIDN'T LAST AND YOU DIDN'T HAVE MONEY TO GET MORE.: NEVER TRUE

## 2025-06-16 SDOH — ECONOMIC STABILITY: FOOD INSECURITY: WITHIN THE PAST 12 MONTHS, YOU WORRIED THAT YOUR FOOD WOULD RUN OUT BEFORE YOU GOT MONEY TO BUY MORE.: NEVER TRUE

## 2025-06-16 SDOH — ECONOMIC STABILITY: INCOME INSECURITY: IN THE LAST 12 MONTHS, WAS THERE A TIME WHEN YOU WERE NOT ABLE TO PAY THE MORTGAGE OR RENT ON TIME?: NO

## 2025-06-16 SDOH — ECONOMIC STABILITY: TRANSPORTATION INSECURITY
IN THE PAST 12 MONTHS, HAS THE LACK OF TRANSPORTATION KEPT YOU FROM MEDICAL APPOINTMENTS OR FROM GETTING MEDICATIONS?: YES

## 2025-06-17 ENCOUNTER — OFFICE VISIT (OUTPATIENT)
Dept: PRIMARY CARE CLINIC | Facility: CLINIC | Age: 60
End: 2025-06-17
Payer: COMMERCIAL

## 2025-06-17 VITALS
WEIGHT: 156.4 LBS | HEART RATE: 71 BPM | RESPIRATION RATE: 15 BRPM | BODY MASS INDEX: 26.7 KG/M2 | HEIGHT: 64 IN | SYSTOLIC BLOOD PRESSURE: 124 MMHG | TEMPERATURE: 97.1 F | OXYGEN SATURATION: 95 % | DIASTOLIC BLOOD PRESSURE: 79 MMHG

## 2025-06-17 DIAGNOSIS — E55.9 VITAMIN D DEFICIENCY: ICD-10-CM

## 2025-06-17 DIAGNOSIS — Z00.00 ANNUAL PHYSICAL EXAM: Primary | ICD-10-CM

## 2025-06-17 DIAGNOSIS — Z00.00 ANNUAL PHYSICAL EXAM: ICD-10-CM

## 2025-06-17 DIAGNOSIS — D12.6 TUBULAR ADENOMA OF COLON: ICD-10-CM

## 2025-06-17 DIAGNOSIS — F41.9 ANXIETY: ICD-10-CM

## 2025-06-17 DIAGNOSIS — R06.83 SNORING: ICD-10-CM

## 2025-06-17 PROCEDURE — 99396 PREV VISIT EST AGE 40-64: CPT | Performed by: INTERNAL MEDICINE

## 2025-06-17 RX ORDER — BUPROPION HYDROCHLORIDE 100 MG/1
100 TABLET, EXTENDED RELEASE ORAL 2 TIMES DAILY
Qty: 60 TABLET | Refills: 3 | Status: SHIPPED | OUTPATIENT
Start: 2025-06-17

## 2025-06-17 ASSESSMENT — PATIENT HEALTH QUESTIONNAIRE - PHQ9
SUM OF ALL RESPONSES TO PHQ QUESTIONS 1-9: 0
2. FEELING DOWN, DEPRESSED OR HOPELESS: NOT AT ALL
SUM OF ALL RESPONSES TO PHQ QUESTIONS 1-9: 0
SUM OF ALL RESPONSES TO PHQ QUESTIONS 1-9: 0
1. LITTLE INTEREST OR PLEASURE IN DOING THINGS: NOT AT ALL
SUM OF ALL RESPONSES TO PHQ QUESTIONS 1-9: 0

## 2025-06-17 NOTE — PROGRESS NOTES
Have you been to the ER, urgent care clinic since your last visit?  Hospitalized since your last visit?   NO    Have you seen or consulted any other health care providers outside our system since your last visit?   NO           
started smoking about 50 years ago. She has a 60 pack-year smoking history. She has never used smokeless tobacco.  She  reports no history of alcohol use.      Review of Systems:   General: negative for - chills, fatigue, fever, weight change  Psych: negative for - anxiety, depression, irritability or mood swings  ENT: negative for - headaches, hearing change, nasal congestion, oral lesions, sneezing or sore throat  Heme/ Lymph: negative for - bleeding problems, bruising, pallor or swollen lymph nodes  Endo: negative for - hot flashes, polydipsia/polyuria or temperature intolerance  Resp: negative for - cough, shortness of breath or wheezing  CV: negative for - chest pain, edema or palpitations  GI: negative for - abdominal pain, change in bowel habits, constipation, diarrhea or nausea/vomiting  : negative for - dysuria, hematuria, incontinence, pelvic pain or vulvar/vaginal symptoms  MSK: negative for - joint pain, joint swelling or muscle pain  Neuro: negative for - confusion, headaches, seizures or weakness  Derm: negative for - dry skin, hair changes, rash or skin lesion changes      Physical:   Vitals:   Vitals:    06/17/25 1222   BP: 124/79   BP Site: Left Upper Arm   Patient Position: Sitting   BP Cuff Size: Medium Adult   Pulse: 71   Resp: 15   Temp: 97.1 °F (36.2 °C)   TempSrc: Temporal   SpO2: 95%   Weight: 70.9 kg (156 lb 6.4 oz)   Height: 1.626 m (5' 4\")       Physical Exam  Eyes: No anemia  Neck: Thyroid not enlarged  Respiratory: Clear to auscultation, no wheezing, rales, or rhonchi  Cardiovascular: Regular rate and rhythm, no murmurs, rubs, or gallops       Exam:   HEENT- atraumatic,normocephalic, awake, oriented, well nourished  Neck - supple,no enlarged lymph nodes, no JVD, no thyromegaly  Chest- CTA, no rhonchi, no crackles  Heart- rrr, no murmurs / gallop/rub  Abdomen- soft,BS+,NT, no hepatosplenomegaly  Ext - no c/c/edema   Neuro- no focal deficits.Power 5/5 all extremities  Skin - warm,dry, no

## 2025-06-18 ENCOUNTER — RESULTS FOLLOW-UP (OUTPATIENT)
Dept: PRIMARY CARE CLINIC | Facility: CLINIC | Age: 60
End: 2025-06-18

## 2025-06-18 DIAGNOSIS — D72.829 LEUKOCYTOSIS, UNSPECIFIED TYPE: Primary | ICD-10-CM

## 2025-06-18 LAB
25(OH)D3 SERPL-MCNC: 32.9 NG/ML (ref 30–100)
ALBUMIN SERPL-MCNC: 3.9 G/DL (ref 3.5–5)
ALBUMIN/GLOB SERPL: 1 (ref 1.1–2.2)
ALP SERPL-CCNC: 130 U/L (ref 45–117)
ALT SERPL-CCNC: 33 U/L (ref 12–78)
ANION GAP SERPL CALC-SCNC: 10 MMOL/L (ref 2–12)
AST SERPL-CCNC: 22 U/L (ref 15–37)
BILIRUB SERPL-MCNC: 0.3 MG/DL (ref 0.2–1)
BUN SERPL-MCNC: 20 MG/DL (ref 6–20)
BUN/CREAT SERPL: 26 (ref 12–20)
CALCIUM SERPL-MCNC: 9.5 MG/DL (ref 8.5–10.1)
CHLORIDE SERPL-SCNC: 104 MMOL/L (ref 97–108)
CHOLEST SERPL-MCNC: 187 MG/DL
CO2 SERPL-SCNC: 24 MMOL/L (ref 21–32)
CREAT SERPL-MCNC: 0.78 MG/DL (ref 0.55–1.02)
ERYTHROCYTE [DISTWIDTH] IN BLOOD BY AUTOMATED COUNT: 13.9 % (ref 11.5–14.5)
EST. AVERAGE GLUCOSE BLD GHB EST-MCNC: 108 MG/DL
GLOBULIN SER CALC-MCNC: 4 G/DL (ref 2–4)
GLUCOSE SERPL-MCNC: 92 MG/DL (ref 65–100)
HBA1C MFR BLD: 5.4 % (ref 4–5.6)
HCT VFR BLD AUTO: 46.4 % (ref 35–47)
HDLC SERPL-MCNC: 54 MG/DL
HDLC SERPL: 3.5 (ref 0–5)
HGB BLD-MCNC: 14.1 G/DL (ref 11.5–16)
LDLC SERPL CALC-MCNC: 97.4 MG/DL (ref 0–100)
MCH RBC QN AUTO: 29 PG (ref 26–34)
MCHC RBC AUTO-ENTMCNC: 30.4 G/DL (ref 30–36.5)
MCV RBC AUTO: 95.3 FL (ref 80–99)
NRBC # BLD: 0 K/UL (ref 0–0.01)
NRBC BLD-RTO: 0 PER 100 WBC
PLATELET # BLD AUTO: 402 K/UL (ref 150–400)
PMV BLD AUTO: 10.5 FL (ref 8.9–12.9)
POTASSIUM SERPL-SCNC: 4.3 MMOL/L (ref 3.5–5.1)
PROT SERPL-MCNC: 7.9 G/DL (ref 6.4–8.2)
RBC # BLD AUTO: 4.87 M/UL (ref 3.8–5.2)
SODIUM SERPL-SCNC: 138 MMOL/L (ref 136–145)
T4 FREE SERPL-MCNC: 1 NG/DL (ref 0.8–1.5)
TRIGL SERPL-MCNC: 178 MG/DL
TSH SERPL DL<=0.05 MIU/L-ACNC: 1.07 UIU/ML (ref 0.36–3.74)
VLDLC SERPL CALC-MCNC: 35.6 MG/DL
WBC # BLD AUTO: 13.6 K/UL (ref 3.6–11)

## (undated) DEVICE — IV START KIT: Brand: MEDLINE

## (undated) DEVICE — ELECTRODE BLDE L4IN NONINSULATED EDGE

## (undated) DEVICE — SNARE ENDOSCP L240CM OD24MM LOOP W10MM RND INSUL STIFF BRAID

## (undated) DEVICE — 450 ML BOTTLE OF 0.05% CHLORHEXIDINE GLUCONATE IN 99.95% STERILE WATER FOR IRRIGATION, USP AND APPLICATOR.: Brand: IRRISEPT ANTIMICROBIAL WOUND LAVAGE

## (undated) DEVICE — GOWN,SIRUS,NONRNF,SETINSLV,2XL,18/CS: Brand: MEDLINE

## (undated) DEVICE — SOLUTION ANTISEP 4OZ 70% ALC ISO 1ST AID

## (undated) DEVICE — PICO 7 SINGLE 15X20CM: Brand: PICO™ 7

## (undated) DEVICE — CUFF BLD PRSS AD CLTH SGL TB W/ BAYNT CONN ROUNDED CORNER

## (undated) DEVICE — SPONGE GZ W4XL4IN COT 12 PLY TYP VII WVN C FLD DSGN STERILE

## (undated) DEVICE — SOLUTION IRRIG 1000ML 0.9% SOD CHL USP POUR PLAS BTL

## (undated) DEVICE — SUTURE STRATAFIX SYMMETRIC SZ 1 L18IN ABSRB VLT CT1 L36CM SXPP1A404

## (undated) DEVICE — SET GRAV CK VLV NEEDLESS ST 3 GANGED 4WAY STPCOCK HI FLO 10

## (undated) DEVICE — NEEDLE SPNL L3.5IN PNK HUB S STL REG WALL FIT STYL W/ QNCKE

## (undated) DEVICE — C-ARMOR C-ARM EQUIPMENT COVERS CLEAR STERILE UNIVERSAL FIT 12 PER CASE: Brand: C-ARMOR

## (undated) DEVICE — NEEDLE BX 11GA L152MM STREAMLINED SUP SHRP T HNDL TRCR TAPR

## (undated) DEVICE — BIT DRILL EXCELSIUS GPS

## (undated) DEVICE — TRANSFER SET 3": Brand: MEDLINE INDUSTRIES, INC.

## (undated) DEVICE — HYPODERMIC SAFETY NEEDLE: Brand: MAGELLAN

## (undated) DEVICE — 1LYRTR 16FR10ML100%SIL UMS SNP: Brand: MEDLINE INDUSTRIES, INC.

## (undated) DEVICE — Device

## (undated) DEVICE — GLOVE SURG SZ 85 L12IN FNGR THK79MIL GRN LTX FREE

## (undated) DEVICE — SYRINGE 20ML LL S/C 50

## (undated) DEVICE — DRAPE C ARM DISP FOR EXCELSIUSGPS ROBOTIC NAVIGATION PLATFRM

## (undated) DEVICE — KIT JACK TBL PT CARE

## (undated) DEVICE — BANDAGE ADH W2XL4IN NITRL FAB STRP CURAD

## (undated) DEVICE — SOLUTION IRRIG 1000ML STRL H2O USP PLAS POUR BTL

## (undated) DEVICE — LAMINECTOMY-MRMC: Brand: MEDLINE INDUSTRIES, INC.

## (undated) DEVICE — DRAPE,LAP,CHOLE,W/TROUGHS,STERILE: Brand: MEDLINE

## (undated) DEVICE — MARKER SKIN 2 TIP UTIL W/ RULER REG LF

## (undated) DEVICE — AGENT HEMOSTATIC SURGIFLOW MATRIX KIT W/THROMBIN

## (undated) DEVICE — SNAP KOVER: Brand: UNBRANDED

## (undated) DEVICE — ELECTRODE PT RET AD L9FT HI MOIST COND ADH HYDRGEL CORDED

## (undated) DEVICE — DRAPE MON DISP FOR EXCELSIUSGPS ROBOTIC NAVIGATION PLATFRM

## (undated) DEVICE — ENDOSCOPIC KIT COMPLIANCE ENDOKIT

## (undated) DEVICE — SYRINGE MED 50ML LUERLOCK TIP

## (undated) DEVICE — APPLICATOR MEDICATED 26 CC SOLUTION HI LT ORNG CHLORAPREP

## (undated) DEVICE — GLOVE SURG SZ 75 L12IN FNGR THK79MIL GRN LTX FREE

## (undated) DEVICE — STERILE POLYISOPRENE POWDER-FREE SURGICAL GLOVES: Brand: PROTEXIS

## (undated) DEVICE — GLOVE ORTHO 7 1/2   MSG9475

## (undated) DEVICE — DRAPE,CHEST,FENES,15X10,STERIL: Brand: MEDLINE

## (undated) DEVICE — SUTURE VICRYL SZ 2-0 L18IN ABSRB UD CT-1 L36MM 1/2 CIR J839D

## (undated) DEVICE — MARKER SURG PASS SPHR NDI

## (undated) DEVICE — TIP SUCT TRNSPAR RIB SURF STD BLB RIG NVENT W/ 5IN1 CONN DYND50138] MEDLINE INDUSTRIES INC]